# Patient Record
Sex: FEMALE | Race: WHITE | NOT HISPANIC OR LATINO | URBAN - METROPOLITAN AREA
[De-identification: names, ages, dates, MRNs, and addresses within clinical notes are randomized per-mention and may not be internally consistent; named-entity substitution may affect disease eponyms.]

---

## 2023-09-14 PROBLEM — L29.0 PRURITUS ANI: Status: ACTIVE | Noted: 2023-09-14

## 2023-09-14 PROBLEM — K57.30 DIVERTICULOSIS OF COLON: Status: ACTIVE | Noted: 2023-09-14

## 2023-09-14 PROBLEM — K64.4 RESIDUAL HEMORRHOIDAL SKIN TAGS: Status: ACTIVE | Noted: 2023-09-14

## 2023-09-14 PROBLEM — N81.6 RECTOCELE: Status: ACTIVE | Noted: 2023-09-14

## 2023-09-14 PROBLEM — K63.5 HYPERPLASTIC COLON POLYP: Status: ACTIVE | Noted: 2023-09-14

## 2023-09-14 RX ORDER — ALBUTEROL SULFATE 0.83 MG/ML
SOLUTION RESPIRATORY (INHALATION)
COMMUNITY

## 2023-09-14 RX ORDER — MONTELUKAST SODIUM 10 MG/1
TABLET ORAL
COMMUNITY

## 2023-10-17 ENCOUNTER — APPOINTMENT (OUTPATIENT)
Dept: OTOLARYNGOLOGY | Facility: CLINIC | Age: 58
End: 2023-10-17
Payer: COMMERCIAL

## 2023-11-08 ENCOUNTER — EVALUATION (OUTPATIENT)
Dept: PHYSICAL THERAPY | Facility: HOSPITAL | Age: 58
End: 2023-11-08
Payer: COMMERCIAL

## 2023-11-08 DIAGNOSIS — M75.42 IMPINGEMENT SYNDROME OF LEFT SHOULDER: Primary | ICD-10-CM

## 2023-11-08 PROCEDURE — 97110 THERAPEUTIC EXERCISES: CPT | Mod: GP | Performed by: PHYSICAL THERAPIST

## 2023-11-08 ASSESSMENT — ENCOUNTER SYMPTOMS
OCCASIONAL FEELINGS OF UNSTEADINESS: 0
DEPRESSION: 0
LOSS OF SENSATION IN FEET: 0

## 2023-11-08 ASSESSMENT — PAIN - FUNCTIONAL ASSESSMENT: PAIN_FUNCTIONAL_ASSESSMENT: 0-10

## 2023-11-08 ASSESSMENT — PAIN SCALES - GENERAL: PAINLEVEL_OUTOF10: 4

## 2023-11-08 ASSESSMENT — ACTIVITIES OF DAILY LIVING (ADL): ADL_ASSISTANCE: INDEPENDENT

## 2023-11-08 ASSESSMENT — PAIN DESCRIPTION - DESCRIPTORS: DESCRIPTORS: DULL;ACHING

## 2023-11-08 NOTE — PROGRESS NOTES
Physical Therapy    Physical Therapy Evaluation and Treatment      Patient Name: Arlet Vásquez  MRN: [id number]  Today's Date: 11/8/2023  Time Calculation  Start Time: 1747  Stop Time: 1818  Time Calculation (min): 31 min      Assessment:  PT Assessment Results: Decreased strength, Pain, Decreased range of motion  Rehab Prognosis: Good  Evaluation/Treatment Tolerance: Patient tolerated treatment well  Patient demonstrated impaired shoulder strength, cervical spine range of motion, and impaired posture. Skilled physical therapy is warranted to address the above stated impairments, so the patient can perform functional activities and work duties without increased pain or difficulty.    Plan:  Treatment/Interventions: Education/ Instruction, Manual therapy, Therapeutic activities, Therapeutic exercises  PT Plan: Skilled PT  PT Frequency: 2 times per week  Duration: 4 weeks  Onset Date: 10/18/23  Certification Period Start Date: 11/08/23  Certification Period End Date: 01/03/24  Rehab Potential: Good  Plan of Care Agreement: Patient    Current Problem:   1. Impingement syndrome of left shoulder            Subjective    General:  General  Reason for Referral: shoulder pain  Referred By: Jose De Jesus  Patient is a 58 year old female presenting with left shoulder pain. Patient states that she has pain with shoulder internal rotation (using the steering wheel). Patient states that her shoulder started hurting in February and has slowly been improving.    Precautions:  Precautions  STEADI Fall Risk Score (The score of 4 or more indicates an increased risk of falling): 0  Medical Precautions: No known precautions/limitation    Pain:  Pain Assessment  Pain Assessment: 0-10  Pain Score: 4  Pain Location: Shoulder  Pain Orientation: Left  Pain Descriptors: Dull, Aching  Pain Frequency: Constant/continuous  Clinical Progression: Gradually improving  Patient's Stated Pain Goal: No pain  Home Living:   Patient lives with family and is  "independent with all ADLs.  Prior Level of Function:  Prior Function Per Pt/Caregiver Report  Level of [place]: Independent with ADLs and functional transfers, Independent with homemaking with ambulation  ADL Assistance: Independent  Homemaking Assistance: Independent  Ambulatory Assistance: Independent  Vocational: Full time employment    Objective     Extremity/Trunk Assessments:     RUE   RUE : Within Functional Limits  RUE Strength  R Shoulder Flexion: 4+/5  R Shoulder ABduction: 4+/5  R Shoulder Internal Rotation: 4+/5  R Shoulder External Rotation: 4+/5  LUE AROM (degrees)  L Shoulder Flexion  0-170: 167 Degrees  L Shoulder ABduction 0-160/180: 170 Degrees  L Shoulder Internal Rotation  0-70: 68 Degrees  L Shoulder External Rotation  0-90: 90 Degrees  LUE Strength  L Shoulder Flexion: 4+/5  L Shoulder ABduction: 4+/5  L Shoulder Internal Rotation: 4+/5  L Shoulder External Rotation: 4+/5    Cervical Spine    Cervical AROM  Cervical AROM WFL: no    Shoulder Functional Rating Scale  Quick Dash: 11.36    Cervical AROM WFL unless documented below  Cervical AROM WFL: no  Cervical flexion: (80°): 50  Cervical extension: (50°): 50  Cervical Rotation: (80°): 57  Cervical rotation left: (80°): 57  Cervical sidebend right: (45°): 21  Cervical sidebend left: (45°): 34    Shoulder Special Tests Negative unless documented below  Shoulder Special Tests Negative: yes  Neer: (Negative): neg  Painful arc: (Negative): neg  UE Special Tests Comments: - Empty can, - Francesca,    Outcome Measures:  Quick Dash: 11.36     Treatments:  Therapeutic Exercise  Therapeutic Exercise Performed: Yes  Therapeutic Exercise Activity 1: scap retraction x10  Therapeutic Exercise Activity 2: lower trap setting x10  Therapeutic Exercise Activity 3: SCM stretch 5x5\"  Therapeutic Exercise Activity 4: pec minor stretch hands behind head 5x5\"    OP EDUCATION:  Education  Individual(s) Educated: Patient  Education Provided: Anatomy, Home " Exercise Program, POC, Posture  Patient/Caregiver Demonstrated Understanding: yes  Plan of Care Discussed and Agreed Upon: yes  Patient Response to Education: Patient/Caregiver Verbalized Understanding of Information    Goals:  Active       PT Problem       Patient will achieve bilateral cervical side bending ROM 40 degrees for improved ability to perform ADLs.       Start:  11/08/23    Expected End:  12/06/23            Patient will achieve bilateral cervical rotation ROM 70 degrees for improved ability to perform ADLs.       Start:  11/08/23    Expected End:  12/06/23            Patient will achieve cervical flexion ROM 70 degrees for improved ability to perform ADLs.       Start:  11/08/23    Expected End:  12/06/23            Patient will demonstrate independence in home program for support of progression       Start:  11/08/23    Expected End:  11/22/23            Patient will report pain of no more than 2/10 demonstrating a reduction of overall pain       Start:  11/08/23    Expected End:  12/06/23            Patient will show a significant change in Quick Dash patient reported outcome tool to demonstrate subjective imporovement       Start:  11/08/23    Expected End:  12/06/23

## 2023-11-08 NOTE — Clinical Note
November 8, 2023     Patient: Arlet Vásquez   YOB: 1965   Date of Visit: 11/8/2023       To Whom It May Concern:    It is my medical opinion that Arlet Vásquez {Work release (duty restriction):33383}.    If you have any questions or concerns, please don't hesitate to call.         Sincerely,        Mulugeta Mitchell, PT    CC: No Recipients

## 2023-11-08 NOTE — Clinical Note
November 8, 2023     Patient: Arlet Vásquez   YOB: 1965   Date of Visit: 11/8/2023       To Whom it May Concern:    Arlet Vásquez was seen in my clinic on 11/8/2023. She {Return to school/sport:83243}.    If you have any questions or concerns, please don't hesitate to call.         Sincerely,          Mulugeta Mitchell, PT        CC: No Recipients

## 2023-11-13 ENCOUNTER — TREATMENT (OUTPATIENT)
Dept: PHYSICAL THERAPY | Facility: HOSPITAL | Age: 58
End: 2023-11-13
Payer: COMMERCIAL

## 2023-11-13 DIAGNOSIS — M75.42 IMPINGEMENT SYNDROME OF LEFT SHOULDER: ICD-10-CM

## 2023-11-13 PROCEDURE — 97140 MANUAL THERAPY 1/> REGIONS: CPT | Mod: GP,CQ

## 2023-11-13 PROCEDURE — 97110 THERAPEUTIC EXERCISES: CPT | Mod: GP,CQ

## 2023-11-13 ASSESSMENT — PAIN SCALES - GENERAL: PAINLEVEL_OUTOF10: 2

## 2023-11-13 ASSESSMENT — PAIN DESCRIPTION - DESCRIPTORS: DESCRIPTORS: ACHING;DULL

## 2023-11-13 ASSESSMENT — PAIN - FUNCTIONAL ASSESSMENT: PAIN_FUNCTIONAL_ASSESSMENT: 0-10

## 2023-11-13 NOTE — PROGRESS NOTES
"Physical Therapy    Physical Therapy Treatment    Patient Name: Arlet Vásquez  MRN: [id number]  Today's Date: 11/13/2023  Time Calculation  Start Time: 1735  Stop Time: 1820  Time Calculation (min): 45 min      Assessment:Pt. Able to tolerate all exercises without c/o pain and felt better after stretches.  PT Assessment  PT Assessment Results: Decreased strength, Pain, Decreased range of motion  Rehab Prognosis: Good  Evaluation/Treatment Tolerance: Patient tolerated treatment well    Plan: continue with PT poc. Advance as tolerated.  OP PT Plan  Treatment/Interventions: Education/ Instruction, Manual therapy, Therapeutic activities, Therapeutic exercises  PT Plan: Skilled PT  PT Frequency: 2 times per week  Rehab Potential: Good  Plan of Care Agreement: Patient    Current Problem  1. Impingement syndrome of left shoulder  Follow Up In Physical Therapy          Subjective Pt. Reports that she had gotten a shot in her L shoulder this morning and her pain is down.  General  Reason for Referral: shoulder pain  Referred By: Jose De Jesus  Precautions  Precautions  Medical Precautions: No known precautions/limitation       Pain  Pain Assessment: 0-10  Pain Score: 2  Pain Location: Shoulder  Pain Orientation: Left  Pain Descriptors: Aching, Dull  Pain Frequency: Constant/continuous  Clinical Progression: Gradually improving  Patient's Stated Pain Goal: No pain    Objective pt. Working on ROM, stretching and started light strengthening exercises to increase strength and mobility of L shoulder                           Treatments:  Therapeutic Exercise  Therapeutic Exercise Performed: Yes  Therapeutic Exercise Activity 1: scap retraction x10  Therapeutic Exercise Activity 2: lower trap setting x10  Therapeutic Exercise Activity 3: SCM stretch 5x5\"  Therapeutic Exercise Activity 4: pec minor stretch hands behind head 5x5\"  Therapeutic Exercise Activity 5: upper trap stretch 10 sec. x 5  Therapeutic Exercise Activity 6: levator " scap stretch 10 sec. x 5  Therapeutic Exercise Activity 7: iso shoulder flex/ext 3 sec. x 10  Therapeutic Exercise Activity 8: 1so IR/ER 3 sec. x 10  Therapeutic Exercise Activity 9: isoabd/add3 sec. x 10  Therapeutic Exercise Activity 10: ergonometer lv 1 x 2 min.  Therapeutic Exercise Activity 11: scap retract/ ext red x 10    Manual Therapy  Manual Therapy Performed: Yes  Manual Therapy Activity 1: IASTM to L UT  Activity:  Endurance: Tolerates 30+ min exercise without fatigue    OP EDUCATION:  Education  Individual(s) Educated: Patient  Education Provided: Anatomy, Home Exercise Program, POC, Posture  Patient/Caregiver Demonstrated Understanding: yes  Plan of Care Discussed and Agreed Upon: yes  Patient Response to Education: Patient/Caregiver Verbalized Understanding of Information    Goals:  Active       PT Problem       Patient will achieve bilateral cervical side bending ROM 40 degrees for improved ability to perform ADLs.       Start:  11/08/23    Expected End:  12/06/23            Patient will achieve bilateral cervical rotation ROM 70 degrees for improved ability to perform ADLs.       Start:  11/08/23    Expected End:  12/06/23            Patient will achieve cervical flexion ROM 70 degrees for improved ability to perform ADLs.       Start:  11/08/23    Expected End:  12/06/23            Patient will demonstrate independence in home program for support of progression       Start:  11/08/23    Expected End:  11/22/23            Patient will report pain of no more than 2/10 demonstrating a reduction of overall pain       Start:  11/08/23    Expected End:  12/06/23            Patient will show a significant change in Quick Dash patient reported outcome tool to demonstrate subjective imporovement       Start:  11/08/23    Expected End:  12/06/23

## 2023-11-15 ENCOUNTER — TREATMENT (OUTPATIENT)
Dept: PHYSICAL THERAPY | Facility: HOSPITAL | Age: 58
End: 2023-11-15
Payer: COMMERCIAL

## 2023-11-15 DIAGNOSIS — M75.42 IMPINGEMENT SYNDROME OF LEFT SHOULDER: ICD-10-CM

## 2023-11-15 PROCEDURE — 97140 MANUAL THERAPY 1/> REGIONS: CPT | Mod: GP,CQ

## 2023-11-15 PROCEDURE — 97110 THERAPEUTIC EXERCISES: CPT | Mod: GP,CQ

## 2023-11-15 ASSESSMENT — PAIN SCALES - GENERAL: PAINLEVEL_OUTOF10: 4

## 2023-11-15 ASSESSMENT — PAIN - FUNCTIONAL ASSESSMENT: PAIN_FUNCTIONAL_ASSESSMENT: 0-10

## 2023-11-15 NOTE — PROGRESS NOTES
"Physical Therapy    Physical Therapy Treatment    Patient Name: Arlet Vásquez  MRN: [id number]  Today's Date: 11/15/2023  Time Calculation  Start Time: 1732  Stop Time: 1812  Time Calculation (min): 40 min      Assessment:Pt was able to tolerate joint mobilizations without c/o pain.  Pt had pain with palpation to the L infraspinatus mm belly, able to tolerate manual technique to decrease mm tension.   Pt required cuing for technique and pace with TE's performed today, with good follow through after instruction.  PT Assessment  PT Assessment Results: Decreased strength, Pain, Decreased range of motion  Rehab Prognosis: Good    Plan:Continue to progress current POC as tolerated to facilitate ability to perform functional activities.   OP PT Plan  PT Plan: Skilled PT  PT Frequency: 2 times per week (2 of 8)    Current Problem  1. Impingement syndrome of left shoulder  Follow Up In Physical Therapy          Subjective Pt reports mild soreness after last session and during t-band extension exercises issued last session.    General  Reason for Referral: shoulder pain  Referred By: Sarahn  Precautions  Precautions  Medical Precautions: No known precautions/limitation  Vital Signs     Pain  Pain Assessment: 0-10  Pain Score: 4  Pain Location: Shoulder  Pain Orientation: Left    Objective   Cognition     Posture     Extremity/Trunk Assessment          Outcome Measures:      Treatments:  Therapeutic Exercise  Therapeutic Exercise Performed: Yes  Therapeutic Exercise Activity 2: lower trap setting x10  Therapeutic Exercise Activity 3: SCM stretch 5x5\"  Therapeutic Exercise Activity 5: upper trap stretch 10 sec. x 5  Therapeutic Exercise Activity 6: levator scap stretch 10 sec. x 5  Therapeutic Exercise Activity 7: iso shoulder flex/ext 3 sec. x 10  Therapeutic Exercise Activity 8: Iso ER with yellow band x 10  Therapeutic Exercise Activity 9: Iso Abduction and Flexion x 10 5\"  Therapeutic Exercise Activity 10: UBE Level 1 2' " each  Therapeutic Exercise Activity 11: Scapular retraction 2 x 10  Therapeutic Exercise Activity 12: B shoulder Extension Red band 2 x 10    Manual Therapy  Manual Therapy Performed: Yes  Manual Therapy Activity 1: L infraspinatus trigger point release to improve soft tissue mobility  Manual Therapy Activity 2: L shoulder posterior and inferior glides grade II-III  Manual Therapy Activity 3: L AC postero/inferior glide and SC inferior glides to increase mobility  Activity:      OP EDUCATION:  Education  Individual(s) Educated: Patient  Education Provided: Anatomy, Home Exercise Program  Patient Response to Education: Patient/Caregiver Verbalized Understanding of Information    Goals:  Active       PT Problem       Patient will achieve bilateral cervical side bending ROM 40 degrees for improved ability to perform ADLs. (Progressing)       Start:  11/08/23    Expected End:  12/06/23            Patient will achieve bilateral cervical rotation ROM 70 degrees for improved ability to perform ADLs. (Progressing)       Start:  11/08/23    Expected End:  12/06/23            Patient will achieve cervical flexion ROM 70 degrees for improved ability to perform ADLs. (Progressing)       Start:  11/08/23    Expected End:  12/06/23            Patient will demonstrate independence in home program for support of progression (Progressing)       Start:  11/08/23    Expected End:  11/22/23            Patient will report pain of no more than 2/10 demonstrating a reduction of overall pain (Progressing)       Start:  11/08/23    Expected End:  12/06/23            Patient will show a significant change in Quick Dash patient reported outcome tool to demonstrate subjective imporovement (Progressing)       Start:  11/08/23    Expected End:  12/06/23

## 2023-11-20 ENCOUNTER — TREATMENT (OUTPATIENT)
Dept: PHYSICAL THERAPY | Facility: HOSPITAL | Age: 58
End: 2023-11-20
Payer: COMMERCIAL

## 2023-11-20 DIAGNOSIS — M75.42 IMPINGEMENT SYNDROME OF LEFT SHOULDER: Primary | ICD-10-CM

## 2023-11-20 PROCEDURE — 97110 THERAPEUTIC EXERCISES: CPT | Mod: GP,CQ

## 2023-11-20 PROCEDURE — 97140 MANUAL THERAPY 1/> REGIONS: CPT | Mod: GP,CQ

## 2023-11-20 ASSESSMENT — PAIN SCALES - GENERAL: PAINLEVEL_OUTOF10: 3

## 2023-11-20 ASSESSMENT — PAIN - FUNCTIONAL ASSESSMENT: PAIN_FUNCTIONAL_ASSESSMENT: 0-10

## 2023-11-20 ASSESSMENT — PAIN DESCRIPTION - DESCRIPTORS: DESCRIPTORS: ACHING;DULL

## 2023-11-20 NOTE — PROGRESS NOTES
"Physical Therapy    Physical Therapy Treatment    Patient Name: Arlet Vásquez  MRN: [id number]  Today's Date: 11/20/2023  Time Calculation  Start Time: 1735  Stop Time: 1825  Time Calculation (min): 50 min      Assessment:Pt. Able to tolerate all exercises, and reported decreased pain to 2/10 at conclusion of therapy. AROM is good, but pt. Experiences a clicking sensation with IR.  PT Assessment  PT Assessment Results: Decreased strength, Pain, Decreased range of motion  Rehab Prognosis: Good  Evaluation/Treatment Tolerance: Patient tolerated treatment well  Plan: cont. With current PT POC, advance as tolerated.  OP PT Plan  PT Plan: Skilled PT  PT Frequency: 2 times per week (3  of 8)  Rehab Potential: Good  Plan of Care Agreement: Patient    Current Problem  1. Impingement syndrome of left shoulder            General     General  Reason for Referral: shoulder pain  Referred By: Jose De Jesus Marley   Pt. Reports she is feeling alright, but her pain has not decreased in 3 visits now.  Precautions  Precautions  Medical Precautions: No known precautions/limitation       Pain  Pain Assessment  Pain Assessment: 0-10  Pain Score: 3  Pain Location: Shoulder  Pain Orientation: Left  Pain Descriptors: Aching, Dull  Clinical Progression: Gradually improving  Patient's Stated Pain Goal: No pain    Objective Pt. Working on stretching and strengthening of L shoulder and scap muscles for return to normal functional activities without pain.                  Activity Tolerance:  Activity Tolerance  Endurance: Tolerates 30+ min exercise without fatigue      Treatments:  Therapeutic Exercise  Therapeutic Exercise Performed: Yes  Therapeutic Exercise Activity 1: iso L shoulder ADD 3 sec. x 10  Therapeutic Exercise Activity 2: lower trap setting x10  Therapeutic Exercise Activity 3: SCM stretch 5x5\"  Therapeutic Exercise Activity 4: pec stretch 15 sec. x 3  Therapeutic Exercise Activity 5: upper trap stretch 10 sec. x 5  Therapeutic " "Exercise Activity 6: levator scap stretch 10 sec. x 5  Therapeutic Exercise Activity 7: iso shoulder flex/ext 3 sec. x 10  Therapeutic Exercise Activity 8: ER IR with yellow band x 10  Therapeutic Exercise Activity 9: Iso Abduction and Flexion x 10 5\"  Therapeutic Exercise Activity 10: UBE Level 1 2' each  Therapeutic Exercise Activity 11: Scapular retraction 2 x 10  Therapeutic Exercise Activity 12: B shoulder Extension Red band 2 x 10  Therapeutic Exercise Activity 13: prone H ABD x 10  Therapeutic Exercise Activity 14: prone scap ret.  Therapeutic Exercise Activity 15: prone bshoulder flex/ext x 10    OP EDUCATION:  Outpatient Education  Individual(s) Educated: Patient  Education Provided: Anatomy, Home Exercise Program  Patient Response to Education: Patient/Caregiver Verbalized Understanding of Information    Goals:  Active       PT Problem       Patient will achieve bilateral cervical side bending ROM 40 degrees for improved ability to perform ADLs. (Progressing)       Start:  11/08/23    Expected End:  12/06/23            Patient will achieve bilateral cervical rotation ROM 70 degrees for improved ability to perform ADLs. (Progressing)       Start:  11/08/23    Expected End:  12/06/23            Patient will achieve cervical flexion ROM 70 degrees for improved ability to perform ADLs. (Progressing)       Start:  11/08/23    Expected End:  12/06/23            Patient will demonstrate independence in home program for support of progression (Progressing)       Start:  11/08/23    Expected End:  11/22/23            Patient will report pain of no more than 2/10 demonstrating a reduction of overall pain (Progressing)       Start:  11/08/23    Expected End:  12/06/23            Patient will show a significant change in Quick Dash patient reported outcome tool to demonstrate subjective imporovement (Progressing)       Start:  11/08/23    Expected End:  12/06/23              "

## 2023-11-22 ENCOUNTER — TREATMENT (OUTPATIENT)
Dept: PHYSICAL THERAPY | Facility: HOSPITAL | Age: 58
End: 2023-11-22
Payer: COMMERCIAL

## 2023-11-22 DIAGNOSIS — M75.42 IMPINGEMENT SYNDROME OF LEFT SHOULDER: ICD-10-CM

## 2023-11-22 PROCEDURE — 97110 THERAPEUTIC EXERCISES: CPT | Mod: GP,CQ

## 2023-11-22 PROCEDURE — 97140 MANUAL THERAPY 1/> REGIONS: CPT | Mod: GP,CQ

## 2023-11-22 ASSESSMENT — PAIN SCALES - GENERAL: PAINLEVEL_OUTOF10: 4

## 2023-11-22 ASSESSMENT — PAIN - FUNCTIONAL ASSESSMENT: PAIN_FUNCTIONAL_ASSESSMENT: 0-10

## 2023-11-22 NOTE — PROGRESS NOTES
"Physical Therapy    Physical Therapy Treatment    Patient Name: Arlet Vásquez  MRN: [id number]  Today's Date: 11/22/2023  Time Calculation  Start Time: 1732  Stop Time: 1812  Time Calculation (min): 40 min      Assessment:Pt stated the ache in her shoulder was relieved with manual techniques completed today.  Pt was able to perform all strengthening TE's without c/o pain in the L shoulder.   PT Assessment  PT Assessment Results: Decreased strength, Pain, Decreased range of motion  Rehab Prognosis: Good  Plan:Continue to progress current POC as tolerated to facilitate ability to perform functional activities.   OP PT Plan  PT Plan: Skilled PT  PT Frequency: 2 times per week    Current Problem  1. Impingement syndrome of left shoulder  Follow Up In Physical Therapy          General  PT  Visit  PT Received On: 11/22/23  Response to Previous Treatment: Patient with no complaints from previous session.  General  Reason for Referral: shoulder pain  Referred By: Jose De Jesus    Subjective  Reports she has a dull pain most of the time, then will have increase pain reaching across her body.    Precautions  Precautions  Medical Precautions: No known precautions/limitation  Vital Signs     Pain  Pain Assessment  Pain Assessment: 0-10  Pain Score: 4  Pain Location: Shoulder  Pain Orientation: Left    Objective   Cognition     Posture     Extremity/Trunk Assessment      Activity Tolerance:     Outcome Measures:    Treatments:  Therapeutic Exercise  Therapeutic Exercise Performed: Yes  Therapeutic Exercise Activity 3: Ball at wall up/down x 20  Therapeutic Exercise Activity 4: pec stretch 15 sec. x 3  Therapeutic Exercise Activity 7: Shoulder flexion 1# x20  Therapeutic Exercise Activity 8: Iso ER with side step x 10 5\"  Therapeutic Exercise Activity 10: UBE Level 1 2' each  Therapeutic Exercise Activity 11: Scapular retraction Red 2 x 10  Therapeutic Exercise Activity 12: B shoulder Extension Red band 2 x 10    Manual Therapy  Manual " Therapy Performed: Yes  Manual Therapy Activity 1: L Shoulder flexion with scap block to increase flexibility  Manual Therapy Activity 2: L shoulder posterior and inferior glides grade II-III  Manual Therapy Activity 3: L AC postero/inferior glide and SC inferior glides to increase mobility    OP EDUCATION:  Outpatient Education  Individual(s) Educated: Patient  Education Provided: Anatomy, Home Exercise Program  Patient Response to Education: Patient/Caregiver Verbalized Understanding of Information    Goals:  Active       PT Problem       Patient will achieve bilateral cervical side bending ROM 40 degrees for improved ability to perform ADLs. (Progressing)       Start:  11/08/23    Expected End:  12/06/23            Patient will achieve bilateral cervical rotation ROM 70 degrees for improved ability to perform ADLs. (Progressing)       Start:  11/08/23    Expected End:  12/06/23            Patient will achieve cervical flexion ROM 70 degrees for improved ability to perform ADLs. (Progressing)       Start:  11/08/23    Expected End:  12/06/23            Patient will demonstrate independence in home program for support of progression (Progressing)       Start:  11/08/23    Expected End:  11/22/23            Patient will report pain of no more than 2/10 demonstrating a reduction of overall pain (Progressing)       Start:  11/08/23    Expected End:  12/06/23            Patient will show a significant change in Quick Dash patient reported outcome tool to demonstrate subjective imporovement (Progressing)       Start:  11/08/23    Expected End:  12/06/23

## 2023-11-27 ENCOUNTER — TREATMENT (OUTPATIENT)
Dept: PHYSICAL THERAPY | Facility: HOSPITAL | Age: 58
End: 2023-11-27
Payer: COMMERCIAL

## 2023-11-27 DIAGNOSIS — M75.42 IMPINGEMENT SYNDROME OF LEFT SHOULDER: Primary | ICD-10-CM

## 2023-11-27 PROCEDURE — 97110 THERAPEUTIC EXERCISES: CPT | Mod: GP,CQ

## 2023-11-27 PROCEDURE — 97140 MANUAL THERAPY 1/> REGIONS: CPT | Mod: GP,CQ

## 2023-11-27 ASSESSMENT — PAIN - FUNCTIONAL ASSESSMENT: PAIN_FUNCTIONAL_ASSESSMENT: 0-10

## 2023-11-27 ASSESSMENT — PAIN SCALES - GENERAL: PAINLEVEL_OUTOF10: 2

## 2023-11-27 ASSESSMENT — PAIN DESCRIPTION - DESCRIPTORS: DESCRIPTORS: ACHING;DULL

## 2023-11-27 NOTE — PROGRESS NOTES
Physical Therapy    Physical Therapy Treatment    Patient Name: Arlet Vásquez  MRN: [id number]  Today's Date: 11/27/2023     Time Calculation  Start Time: 1740  Stop Time: 1825  Time Calculation (min): 45 min    Assessment/Plan Pt. Able to tolerate all exercises and manual therapy and reported no increased pain. Pt able to achieve full flexion supine, but reported a clicking sensation towards end range. Will continue with current PT POC, and advance as tolerated.  PT Assessment  PT Assessment Results: Decreased strength, Decreased range of motion, Pain  Rehab Prognosis: Good  Evaluation/Treatment Tolerance: Patient tolerated treatment well  PT Plan  Inpatient/Swing Bed or Outpatient: Outpatient  OP PT Plan  PT Plan: Skilled PT  PT Frequency: 2 times per week (5 of 8)  Rehab Potential: Good  Plan of Care Agreement: Patient      General Visit Information:   PT  Visit  PT Received On: 11/27/23  Response to Previous Treatment: Patient with no complaints from previous session.  General  Reason for Referral: shoulder pain  Referred By: Jose De Jesus Marley Pt. Reported that she has less pain today at 2/10 and she believes she is progressivly getting better. Pt. States she is compliant with HEP and has no questions at this time.  Precautions:  Precautions  Medical Precautions: No known precautions/limitation         Objective Pt. Working on L shoulder ROM and strength to decrease pain and improve mobility.  Pain:  Pain Assessment  Pain Assessment: 0-10  Pain Score: 2  Pain Location: Shoulder  Pain Orientation: Left  Pain Descriptors: Aching, Dull  Clinical Progression: Gradually improving  Patient's Stated Pain Goal: No pain                Activity Tolerance:  Activity Tolerance  Endurance: Tolerates 30+ min exercise without fatigue  Treatments:  Therapeutic Exercise  Therapeutic Exercise Performed: Yes  Therapeutic Exercise Activity 3: Ball at wall up/down x 20  Therapeutic Exercise Activity 4: pec stretch 15 sec. x  "3  Therapeutic Exercise Activity 7: Shoulder flexion 1# x20  Therapeutic Exercise Activity 8: Iso ER with side step x 10 5\"  Therapeutic Exercise Activity 10: UBE lv. 2 2 min ea.  Therapeutic Exercise Activity 11: Scapular retraction Red 2 x 10  Therapeutic Exercise Activity 12: B shoulder Extension Red band 2 x 10  Therapeutic Exercise Activity 13: prone H ABD x 10    Manual Therapy  Manual Therapy Performed: Yes  Manual Therapy Activity 1: L shoulder flex with scap block  Manual Therapy Activity 2: L shoulder post/inf glides grade 2/3      Education Documentation  No documentation found.  Education Comments  No comments found.        OP EDUCATION:  Outpatient Education  Individual(s) Educated: Patient  Education Provided: Body Mechanics, Home Exercise Program  Patient Response to Education: Patient/Caregiver Verbalized Understanding of Information    Active       PT Problem       Patient will achieve bilateral cervical side bending ROM 40 degrees for improved ability to perform ADLs. (Progressing)       Start:  11/08/23    Expected End:  12/06/23            Patient will achieve bilateral cervical rotation ROM 70 degrees for improved ability to perform ADLs. (Progressing)       Start:  11/08/23    Expected End:  12/06/23            Patient will achieve cervical flexion ROM 70 degrees for improved ability to perform ADLs. (Progressing)       Start:  11/08/23    Expected End:  12/06/23            Patient will demonstrate independence in home program for support of progression (Progressing)       Start:  11/08/23    Expected End:  11/22/23         Goal Note       Patient will demonstrate independence in home program for support of progression              Patient will report pain of no more than 2/10 demonstrating a reduction of overall pain (Progressing)       Start:  11/08/23    Expected End:  12/06/23            Patient will show a significant change in Quick Dash patient reported outcome tool to demonstrate " subjective imporovement (Progressing)       Start:  11/08/23    Expected End:  12/06/23

## 2023-11-29 ENCOUNTER — TREATMENT (OUTPATIENT)
Dept: PHYSICAL THERAPY | Facility: HOSPITAL | Age: 58
End: 2023-11-29
Payer: COMMERCIAL

## 2023-11-29 DIAGNOSIS — M75.42 IMPINGEMENT SYNDROME OF LEFT SHOULDER: Primary | ICD-10-CM

## 2023-11-29 PROCEDURE — 97140 MANUAL THERAPY 1/> REGIONS: CPT | Mod: GP | Performed by: PHYSICAL THERAPIST

## 2023-11-29 PROCEDURE — 97110 THERAPEUTIC EXERCISES: CPT | Mod: GP | Performed by: PHYSICAL THERAPIST

## 2023-11-29 ASSESSMENT — PAIN DESCRIPTION - DESCRIPTORS: DESCRIPTORS: ACHING

## 2023-11-29 ASSESSMENT — PAIN - FUNCTIONAL ASSESSMENT: PAIN_FUNCTIONAL_ASSESSMENT: 0-10

## 2023-11-29 ASSESSMENT — PAIN SCALES - GENERAL: PAINLEVEL_OUTOF10: 2

## 2023-11-29 NOTE — PROGRESS NOTES
Physical Therapy    Physical Therapy Treatment    Patient Name: Arlet Vásquez  MRN: [id number]  Today's Date: 11/29/2023  Time Calculation  Start Time: 1726  Stop Time: 1806  Time Calculation (min): 40 min      Assessment:  PT Assessment  PT Assessment Results: Decreased strength, Decreased range of motion, Pain  Rehab Prognosis: Good  Evaluation/Treatment Tolerance: Patient tolerated treatment well  Patient is progressing with physical therapy. She appears to be having an easier time at work with her shoulder pain decreasing. Patient demonstrated good tolerance to PROGRESSIVE RESISTED EXERCISE and manual therapy without complaints of pain.     Plan:  OP PT Plan  Treatment/Interventions: Education/ Instruction, Manual therapy, Therapeutic activities, Therapeutic exercises  PT Plan: Skilled PT  PT Frequency: 2 times per week (7 of 8)  Duration: 4 weeks  Onset Date: 10/18/23  Certification Period Start Date: 11/08/23  Certification Period End Date: 01/03/24  Rehab Potential: Good  Plan of Care Agreement: Patient    Current Problem  1. Impingement syndrome of left shoulder  Follow Up In Physical Therapy          General  PT  Visit  PT Received On: 11/29/23  Response to Previous Treatment: Patient with no complaints from previous session.  General  Reason for Referral: shoulder pain  Referred By: Jose De Jesus    Subjective    Precautions  Precautions  Medical Precautions: No known precautions/limitation  Patient states that her shoulder is hurting less and feels like it's getting better.    Pain  Pain Assessment  Pain Assessment: 0-10  Pain Score: 2  Pain Location: Shoulder  Pain Orientation: Left  Pain Descriptors: Aching  Clinical Progression: Gradually improving  Patient's Stated Pain Goal: No pain    Objective     Treatments:  Therapeutic Exercise  Therapeutic Exercise Performed: Yes  Therapeutic Exercise Activity 2: lower trap setting @ wall x10  Therapeutic Exercise Activity 3: Ball at wall up/down x 20  Therapeutic  "Exercise Activity 4: pec stretch 15 sec. x 3  Therapeutic Exercise Activity 5: upper trap stretch 1x45\"  Therapeutic Exercise Activity 6: levator scap stretch 1x45\"  Therapeutic Exercise Activity 7: Shoulder flexion 2# x15  Therapeutic Exercise Activity 8: Iso ER with red band and side step x 10 5\"  Therapeutic Exercise Activity 10: UBE lv. 2 2 min ea.  Therapeutic Exercise Activity 11: Scapular retraction grn band x15  Therapeutic Exercise Activity 12: B shoulder Extension grn band x15  Therapeutic Exercise Activity 13: prone H ABD x 15  Therapeutic Exercise Activity 14: supine scap protraction x10  Therapeutic Exercise Activity 16: wall walks with yellow band lateral and diagonal up/out x5 each R/L  Therapeutic Exercise Activity 17: s/l shoulder abd x20  Therapeutic Exercise Activity 18: scm stretch 1x45\"    Manual Therapy  Manual Therapy Performed: Yes  Manual Therapy Activity 1: L shoulder flex with scap block  Manual Therapy Activity 2: L shoulder post/inf glides grade 3    OP EDUCATION:  Outpatient Education  Individual(s) Educated: Patient  Education Provided: Body Mechanics, Home Exercise Program, POC  Patient Response to Education: Patient/Caregiver Verbalized Understanding of Information    Goals:  Active       PT Problem       Patient will achieve bilateral cervical side bending ROM 40 degrees for improved ability to perform ADLs. (Progressing)       Start:  11/08/23    Expected End:  12/06/23            Patient will achieve bilateral cervical rotation ROM 70 degrees for improved ability to perform ADLs. (Progressing)       Start:  11/08/23    Expected End:  12/06/23            Patient will achieve cervical flexion ROM 70 degrees for improved ability to perform ADLs. (Progressing)       Start:  11/08/23    Expected End:  12/06/23            Patient will demonstrate independence in home program for support of progression (Progressing)       Start:  11/08/23    Expected End:  11/22/23         Goal Note  "      Patient will demonstrate independence in home program for support of progression              Patient will report pain of no more than 2/10 demonstrating a reduction of overall pain (Progressing)       Start:  11/08/23    Expected End:  12/06/23            Patient will show a significant change in Quick Dash patient reported outcome tool to demonstrate subjective imporovement (Progressing)       Start:  11/08/23    Expected End:  12/06/23

## 2023-12-06 ENCOUNTER — TREATMENT (OUTPATIENT)
Dept: PHYSICAL THERAPY | Facility: HOSPITAL | Age: 58
End: 2023-12-06
Payer: COMMERCIAL

## 2023-12-06 DIAGNOSIS — M75.42 IMPINGEMENT SYNDROME OF LEFT SHOULDER: Primary | ICD-10-CM

## 2023-12-06 PROCEDURE — 97110 THERAPEUTIC EXERCISES: CPT | Mod: GP | Performed by: PHYSICAL THERAPIST

## 2023-12-06 ASSESSMENT — PAIN SCALES - GENERAL: PAINLEVEL_OUTOF10: 1

## 2023-12-06 ASSESSMENT — PAIN - FUNCTIONAL ASSESSMENT: PAIN_FUNCTIONAL_ASSESSMENT: 0-10

## 2023-12-06 ASSESSMENT — PAIN DESCRIPTION - DESCRIPTORS: DESCRIPTORS: ACHING

## 2023-12-06 NOTE — PROGRESS NOTES
Physical Therapy    Physical Therapy Treatment and Discharge      Patient Name: Arlet Vásquez  MRN: [id number]  Today's Date: 12/6/2023  Time Calculation  Start Time: 1731  Stop Time: 1809  Time Calculation (min): 38 min    Assessment:  PT Assessment  Evaluation/Treatment Tolerance: Patient tolerated treatment well   Patient demonstrated good progress. Patient's pain is less than a 1 and she is no longer having shoulder pain while driving. Patient is appropriate to discharge to her home exercise program. Patient agrees with the plan.    Plan:  OP PT Plan  Treatment/Interventions: Education/ Instruction, Manual therapy, Therapeutic activities, Therapeutic exercises  PT Plan: No Additional PT interventions required at this time    Current Problem:   1. Impingement syndrome of left shoulder  Follow Up In Physical Therapy          Subjective    General:  General  Reason for Referral: shoulder pain  Referred By: Jose De Jesus  Patient states that her shoulder is feeling good. She states that she is not having pain when turning the steering wheel like she was having prior to physical therapy.    Precautions:  Precautions  Medical Precautions: No known precautions/limitation  Vital Signs:     Pain:  Pain Assessment  Pain Assessment: 0-10  Pain Score: 1  Pain Location: Shoulder  Pain Orientation: Left  Pain Descriptors: Aching  Clinical Progression: Gradually improving  Patient's Stated Pain Goal: No pain    Objective   Extremity/Trunk Assessments:        LUE Strength  L Shoulder Flexion: 5/5  L Shoulder ABduction: 5/5  L Shoulder Internal Rotation: 5/5  L Shoulder External Rotation: 5/5  Cervical Spine  Cervical AROM WFL unless documented below  Cervical flexion: (80°): 46  Cervical extension: (50°): 50  Cervical Rotation: (80°): 68  Cervical rotation left: (80°): 60  Cervical sidebend right: (45°): 28  Cervical sidebend left: (45°): 37    Outcome Measures:  Quick Dash=2.27     Treatments:  Therapeutic Exercise  Therapeutic  "Exercise Performed: Yes  Therapeutic Exercise Activity 4: pec stretch 15 sec. x 3  Therapeutic Exercise Activity 5: upper trap stretch 1x45\"  Therapeutic Exercise Activity 6: levator scap stretch 1x45\"  Therapeutic Exercise Activity 7: Shoulder flexion 2# x15  Therapeutic Exercise Activity 8: Iso ER with red band and side step x 10 5\"  Therapeutic Exercise Activity 10: UBE lv. 2 2 min fwd/bwd seat #6  Therapeutic Exercise Activity 11: Scapular retraction grn band x20  Therapeutic Exercise Activity 12: B shoulder Extension grn band x20  Therapeutic Exercise Activity 14: supine scap protraction 1# x10  Therapeutic Exercise Activity 15: PNF D2 shoulder flex yellow band x10  Therapeutic Exercise Activity 16: wall walks with yellow band lateral and diagonal up/out x5 each R/L  Therapeutic Exercise Activity 17: s/l shoulder abd 1#x15  Therapeutic Exercise Activity 18: scm stretch 1x45\"    EDUCATION:  Outpatient Education  Individual(s) Educated: Patient  Education Provided: Body Mechanics, Home Exercise Program, POC  Patient Response to Education: Patient/Caregiver Verbalized Understanding of Information    Goals:  Active       PT Problem       Patient will achieve bilateral cervical side bending ROM 40 degrees for improved ability to perform ADLs. (Progressing)       Start:  11/08/23    Expected End:  12/06/23            Patient will achieve bilateral cervical rotation ROM 70 degrees for improved ability to perform ADLs. (Progressing)       Start:  11/08/23    Expected End:  12/06/23            Patient will achieve cervical flexion ROM 70 degrees for improved ability to perform ADLs. (Progressing)       Start:  11/08/23    Expected End:  12/06/23            Patient will demonstrate independence in home program for support of progression (Met)       Start:  11/08/23    Expected End:  11/22/23    Resolved:  12/06/23         Patient will report pain of no more than 2/10 demonstrating a reduction of overall pain (Met)       " Start:  11/08/23    Expected End:  12/06/23    Resolved:  12/06/23         Patient will show a significant change in Quick Dash patient reported outcome tool to demonstrate subjective imporovement (Met)       Start:  11/08/23    Expected End:  12/06/23    Resolved:  12/06/23

## 2023-12-28 ENCOUNTER — OFFICE VISIT (OUTPATIENT)
Dept: SLEEP MEDICINE | Facility: CLINIC | Age: 58
End: 2023-12-28
Payer: COMMERCIAL

## 2023-12-28 VITALS
HEART RATE: 72 BPM | OXYGEN SATURATION: 94 % | DIASTOLIC BLOOD PRESSURE: 75 MMHG | HEIGHT: 63 IN | WEIGHT: 168.2 LBS | SYSTOLIC BLOOD PRESSURE: 108 MMHG | BODY MASS INDEX: 29.8 KG/M2

## 2023-12-28 DIAGNOSIS — G47.30 SLEEP-DISORDERED BREATHING: Primary | ICD-10-CM

## 2023-12-28 DIAGNOSIS — J45.909 ASTHMA, UNSPECIFIED ASTHMA SEVERITY, UNSPECIFIED WHETHER COMPLICATED, UNSPECIFIED WHETHER PERSISTENT (HHS-HCC): ICD-10-CM

## 2023-12-28 DIAGNOSIS — E66.3 OVERWEIGHT (BMI 25.0-29.9): ICD-10-CM

## 2023-12-28 DIAGNOSIS — J30.2 SEASONAL ALLERGIES: ICD-10-CM

## 2023-12-28 DIAGNOSIS — R06.83 SNORING: ICD-10-CM

## 2023-12-28 PROBLEM — K57.30 DIVERTICULOSIS OF COLON: Status: RESOLVED | Noted: 2023-09-14 | Resolved: 2023-12-28

## 2023-12-28 PROCEDURE — 3008F BODY MASS INDEX DOCD: CPT

## 2023-12-28 PROCEDURE — 99203 OFFICE O/P NEW LOW 30 MIN: CPT

## 2023-12-28 PROCEDURE — 1036F TOBACCO NON-USER: CPT

## 2023-12-28 RX ORDER — LIFITEGRAST 50 MG/ML
1 SOLUTION/ DROPS OPHTHALMIC 2 TIMES DAILY
COMMUNITY

## 2023-12-28 RX ORDER — FLUTICASONE PROPIONATE 50 MCG
1 SPRAY, SUSPENSION (ML) NASAL AS NEEDED
COMMUNITY

## 2023-12-28 ASSESSMENT — SLEEP AND FATIGUE QUESTIONNAIRES
SITING INACTIVE IN A PUBLIC PLACE LIKE A CLASS ROOM OR A MOVIE THEATER: SLIGHT CHANCE OF DOZING
HOW LIKELY ARE YOU TO NOD OFF OR FALL ASLEEP WHILE LYING DOWN TO REST IN THE AFTERNOON WHEN CIRCUMSTANCES PERMIT: MODERATE CHANCE OF DOZING
HOW LIKELY ARE YOU TO NOD OFF OR FALL ASLEEP WHILE SITTING AND TALKING TO SOMEONE: WOULD NEVER DOZE
WORRIED_DISTRESSED_DUE_TO_SLEEP: SOMEWHAT
SLEEP_PROBLEM_NOTICEABLE_TO_OTHERS: MUCH
HOW LIKELY ARE YOU TO NOD OFF OR FALL ASLEEP WHEN YOU ARE A PASSENGER IN A CAR FOR AN HOUR WITHOUT A BREAK: SLIGHT CHANCE OF DOZING
HOW LIKELY ARE YOU TO NOD OFF OR FALL ASLEEP WHILE WATCHING TV: SLIGHT CHANCE OF DOZING
SLEEP_PROBLEM_INTERFERES_DAILY_ACTIVITIES: NOT AT ALL NOTICEABLE
HOW LIKELY ARE YOU TO NOD OFF OR FALL ASLEEP IN A CAR, WHILE STOPPED FOR A FEW MINUTES IN TRAFFIC: WOULD NEVER DOZE
ESS-CHAD TOTAL SCORE: 9
DIFFICULTY_STAYING_ASLEEP: MILD
HOW LIKELY ARE YOU TO NOD OFF OR FALL ASLEEP WHILE SITTING QUIETLY AFTER LUNCH WITHOUT ALCOHOL: SLIGHT CHANCE OF DOZING
SATISFACTION_WITH_CURRENT_SLEEP_PATTERN: SATISFIED
WAKING_TOO_EARLY: MILD
HOW LIKELY ARE YOU TO NOD OFF OR FALL ASLEEP WHILE SITTING AND READING: HIGH CHANCE OF DOZING

## 2023-12-28 ASSESSMENT — ANXIETY QUESTIONNAIRES
6. BECOMING EASILY ANNOYED OR IRRITABLE: NOT AT ALL
3. WORRYING TOO MUCH ABOUT DIFFERENT THINGS: NOT AT ALL
1. FEELING NERVOUS, ANXIOUS, OR ON EDGE: NOT AT ALL
2. NOT BEING ABLE TO STOP OR CONTROL WORRYING: NOT AT ALL
7. FEELING AFRAID AS IF SOMETHING AWFUL MIGHT HAPPEN: NOT AT ALL
GAD7 TOTAL SCORE: 0
4. TROUBLE RELAXING: NOT AT ALL
5. BEING SO RESTLESS THAT IT IS HARD TO SIT STILL: NOT AT ALL

## 2023-12-28 ASSESSMENT — PATIENT HEALTH QUESTIONNAIRE - PHQ9
2. FEELING DOWN, DEPRESSED OR HOPELESS: NOT AT ALL
SUM OF ALL RESPONSES TO PHQ9 QUESTIONS 1 AND 2: 0
1. LITTLE INTEREST OR PLEASURE IN DOING THINGS: NOT AT ALL

## 2023-12-28 NOTE — LETTER
Your Provider has ordered you a sleep study.  The process for a home sleep study is as follows:    HOME SLEEP STUDY    Your test was ordered today. It will usually take 2 - 3 business days for Insurance to approve the order.     Once you test is approved it will be sent to the ordering sleep lab. When the sleep lab is notified of the new order, they will reach out to you to get you scheduled for a pick-up date for your Home Sleep Study Kit or notify you of when it will be mailed (CLEVMED).     They usually will reach out to you about 1 week after your test is ordered. Please contact the office at [phone] if you have not heard back from them in 2 weeks after you have seen your provider. See below for list of sleep lab contact numbers, if needed.     Sleep Lab Contact Information:   Main Phone Line (scheduling only): [phone] ([phone]), option 3  Adult and Pediatric Locations  [facility] (6 years and older): [facility] - 4th floor ([facility] [facility]) After hours line: [phone]  [facility] (Main campus: All ages): [facility], 6th floor. After hours line: [phone]   [place] (5 years and older; younger considered on case-by-case basis): [address]; [address], [address]. Scheduling  After hours line: [phone]   [place] (6 years and older): [address]; [address]; [address]   [place] (6 years and older): [address] [facility], [address]  After hours line: [phone]   [place] (13 years and older) in [place]: [address], 2nd floor  After hours line: [phone]   [place] (13 year and older): [address]  After hours line: [phone]     Adult Only Locations:   [place] (18 years and older): [address] [facility], 2nd [address]   [place] (18 years and older): [address] [facility]; 4th floor  After hours line: [phone]  [facility] (18 years and older) at  [place]: [address]  After hours line: [phone]

## 2023-12-28 NOTE — PATIENT INSTRUCTIONS
It was a pleasure meeting you today Arlet Vásquez     As we discussed today in clinic:    1. Do in-home sleep testing.    2. Encouraged to lose weight.   3. Remember, don't drive when sleepy.   4. Stay off your back when sleeping.      As a general guideline, please replace your: PAP cushions every 2-4 weeks, mask every 3-6 months, hose every 3-6 months, Filter (disposable) every 2-4 weeks; machine may need replacement in 5-10 years (once they stop working).     Education handouts given: Sleep Study Information    Please follow-up in 4 - 6 weeks after testing    ALWAYS BRING YOUR CPAP / BIPAP WITH YOU TO EVERY APPOINTMENT!  THANKS    EDUCATION WEBSITES for further information:   1. American Academy of Sleep Medicine http://sleepeducation.org  2. National Sleep Foundation: https://sleepfoundation.org  3. American Sleep Apnea Association: https://www.sleepapnea.org (for patients with sleep apnea)  4. Go to www.inspiresleep.com learn about [employer].    FOR QUESTIONS AND CONCERNS:   1. In case of problems with machine or mask interface, please contact your DME company first. DME is the company that provides you the machine and/or CPAP supplies. If [employer] if your DME, you can reach them at [phone] or [employer] ([employer]) [phone].  2. For SLEEP STUDY appointments, please call [phone] ([place]) or [phone] / [phone] ([facility]) or any other  Sleep Lab location please call [phone]  3. For MEDICAL QUESTIONS, MEDICATION REFILLS, or CLINIC APPOINTMENT SCHEDULING, please call [phone] and my practice lead Fanny would gladly assist you with any concerns.   4. In the event that you are running more than 10 minutes late to your appointment or 5 minutes to virtual, I will kindly ask you to reschedule.    Here at [facility], we wish you a restful sleep!

## 2023-12-28 NOTE — PROGRESS NOTES
Patient: Arlet Vásquez  : 1965 AGE: 58 y.o. SEX:female   MRN: [id number]   Provider: [facility]     Location [facility]   Service Date: 2023     PCP: FITO Maloney   Referred by: No ref. provider found            [facility]  NEW PATIENT VISIT NOTE        HISTORY OF PRESENT ILLNESS     The patient's referring provider is: No ref. provider found  The patient's Primary Care Provider: FITO Maloney    HISTORY OF PRESENT ILLNESS   Arlet Vásquez is a 58 y.o. female with a pertinent hx of snoring, overweight, Asthma, seasonal allergies, and vitamin D deficiency, who presents to a [facility] for a sleep medicine evaluation with concerns of evaluation for sleep apnea    Patient reports that she occasionally snores, usually not bothersome to patient more her . Patient reports that snoring is worse with intake of alcohol. Patient reports that sleep overall is good. She sleeps mostly through the night, occasionally will get up to use restroom once. She wakes up feeling refreshed, denied any daytime sleepiness or fatigue.       SLEEP STUDY HISTORY  Ordered HSAT today    SLEEP-WAKE SCHEDULE  Bedtime:  9 - 11 pm on weekdays, 10 - 11 pm on weekends  Subjective sleep latency: 5 mins or less  Difficulty falling asleep: No    Number of awakenings: x1 times per night spontaneously  Falls back asleep in 10 mins or less  Difficulty staying asleep: No  due to nocturia  Final wake time:  545 - 7 am on weekdays, 7 - 8 am on weekends  Out of bed time: 545 - 7 am on weekdays, 7 - 8 am on weekends  [facility]   Naps: x1 per week for 20 mins  Feels rested after a nap: Yes   Average sleep duration (excluding naps): 8 - 9 hrs    SLEEP ENVIRONMENT  Sleep location: bed  Sleep status: sleeps with   Preferred sleep position: back and side  TV in bedroom:    Room is dark:  Yes  Room is quiet: Yes  Room is cool: Yes  Bed comfort: good    SLEEP HABITS   Activities before bedtime:   Activities in bed:   Clockwatching: No   Smoking: former  ETOH:  2- 3 glasses of wine per night  Marijuana: denied  Caffeine: daily  Sleep aids: denied    WEIGHT: stable    Claustrophobia: No     STOP-BAN  ESS: 9   ANTONIETA: 9  PHQ-2:  NEGATIVE SCREEN  KIKO-7: 0      REVIEW OF SYSTEMS     REVIEW OF SYSTEMS  Sleep-related ROS:  Night symptoms: POSITIVE for snoring  Morning symptoms: Patient denies morning symptoms and admits waking up refreshed in the morning.   Daytime symptoms Patient denies daytime sleepiness, fatigue, and drowsy driving. Patient also denies issues with memory, mood, and concentration.  Hypersomnia / narcolepsy symptoms: Patient denies symptoms of a hypersomnolence disorder such as sleep paralysis, sleep-related hallucinations, recurrent sleep attacks, automatic behaviors, and cataplexy.   Parasomnia symptoms: Patient denies symptoms of parasomnia.  Movements in sleep: Patient denies problematic movements in sleep,     RLS screen:  RLSSCREEN: - Sensations: Patient does not have unusual sensations in their extremities that cause an urge to move them   - Movement: Patient has not been told that their legs kick or jerk during sleep    Naps:   Yes. Naps ARE/ARENOT: are refreshing.    Review of Systems  SLEEP ROS: snoring    All other systems have been reviewed and are negative.      ALLERGIES AND MEDICATIONS     ALLERGIES  No Known Allergies    MEDICATIONS  Current Outpatient Medications   Medication Sig Dispense Refill    albuterol 2.5 mg /3 mL (0.083 %) nebulizer solution Inhale.      fluticasone (Flonase) 50 mcg/actuation nasal spray Administer 1 spray into each nostril if needed for rhinitis. Shake gently. Before first use, prime pump. After use, clean tip and replace cap.      lifitegrast (Xiidra) 5 % dropperette Administer 1 drop into affected eye(s) 2 times a day.       "montelukast (Singulair) 10 mg tablet Take by mouth.       No current facility-administered medications for this visit.         PAST HISTORY     PERTINENT PAST MEDICAL HISTORY: See HPI    PERTINENT PAST SURGICAL HISTORY for Sleep Medicine:  non-contributory    PERTINENT FAMILY HISTORY for Sleep Medicine:  Patient denies family history of any sleep disorder.  Patient denies family history of sleep apnea.    PERTINENT SOCIAL HISTORY:  She  reports that she has quit smoking. Her smoking use included cigarettes. She has never used smokeless tobacco. No history on file for alcohol use and drug use. She currently lives with family and employed full-time    Active Problems, Allergy List, Medication List, and PMH/PSH/FH/Social Hx have been reviewed and reconciled in chart. No significant changes unless documented in the pertinent chart section. Updates made when necessary.       PHYSICAL EXAM     VITAL SIGNS: /75   Pulse 72   Ht 1.6 m (5' 3\")   Wt 76.3 kg (168 lb 3.2 oz)   SpO2 94%   BMI 29.80 kg/m²     NECK CIRCUMFERENCE:     CURRENT WEIGHT:   Vitals:    12/28/23 0734   Weight: 76.3 kg (168 lb 3.2 oz)      BMI: Body mass index is 29.8 kg/m².      PREVIOUS WEIGHTS:  Wt Readings from Last 3 Encounters:   12/28/23 76.3 kg (168 lb 3.2 oz)   10/24/19 70.3 kg (154 lb 15.7 oz)       Physical Exam  Constitutional: Awake, not in distress  Lungs: Clear to auscultation bilateral, no cough noted  Heart: Regular rate and rhythm  Skin: Warm, no rash  Neuro: No tremors, moves all extremities  Psych: alert and oriented to time, place, and person    ENT: Modified Mallampati score - II           RESULTS/DATA     No results found for: \"IRON\", \"TRANSFERRIN\", \"IRONSAT\", \"TIBC\", \"FERRITIN\"    Bicarbonate   Date Value Ref Range Status   06/14/2023 25 21 - 32 mmol/L Final       PAP Adherence  Not applicable      ASSESSMENT/PLAN     Assessment/Plan   Arlet Vásquez is a 58 y.o. female presents today in [facility] with the following problems:      SLEEP DISORDERED BREATHING/SUSPECTED SLEEP APNEA  - Patient's risk factors for JASMIN: age, postmenopause, asthma, use of ETOH, and narrow crowded upper airway anatomy  - We discussed testing options today in clinic, HSAT vs In-lab  - HSAT is reasonable as patient likely has JASMIN based on history and exam and does not have any of the following comorbidities: Afib, CHF, seizures, neuromuscular weakness, hypoventilation, or significant COPD.   - Discussed JASMIN pathophysiology, diagnostic testing (HST vs PSG), cardiometabolic and neurocognitive sequelae of untreated JASMIN, and treatment options (PAP therapy, oral appliance, surgery, hypoglossal nerve stimulator called INSPIRE, etc).        OVERWEIGHT  - BMI today Body mass index is 29.8 kg/m².   - most recent Bicarb WNL, low suspicion for obesity hypoventilation syndrome  - Encouraged patient to lose weight with diet and exercise.   - Weight loss can help in the long term treatment of JASMIN.  - Defer management to PCP    ALLERGIC RHINITIS / SEASONAL ALLERGIES / ASTHMA  - Continue with fluticasone nasal spray as prescribed  - Continue with singular & albuterol inhaler as prescribed   - reports only uses inhaler ~5 per year   - symptoms well controlled with prescribed medications    All of patient's questions were answered. She verbalizes understanding and agreement with my assessment and plan.

## 2024-01-22 ENCOUNTER — PROCEDURE VISIT (OUTPATIENT)
Dept: SLEEP MEDICINE | Facility: CLINIC | Age: 59
End: 2024-01-22
Payer: COMMERCIAL

## 2024-01-22 DIAGNOSIS — G47.30 SLEEP-DISORDERED BREATHING: ICD-10-CM

## 2024-01-22 DIAGNOSIS — R06.83 SNORING: ICD-10-CM

## 2024-01-22 DIAGNOSIS — G47.33 OBSTRUCTIVE SLEEP APNEA (ADULT) (PEDIATRIC): ICD-10-CM

## 2024-01-22 PROCEDURE — 95806 SLEEP STUDY UNATT&RESP EFFT: CPT | Performed by: PSYCHIATRY & NEUROLOGY

## 2024-01-22 NOTE — PROGRESS NOTES
The patient received equipment and instructions for use of the [employer] [facility] Nomad HSAT device. The patient was instructed how to apply the effort belts, cannula, thermistor. It was also explained how the Nomad and oximeter components work.  The patient was asked to record their sleep for an 8-hour period.     The patient was informed of their responsibility for the device and acknowledged this by signing the HSAT device contract. The patient was asked to return the device on 01/23/2024 to [facility]'s front .     The patient was instructed to call 911 as usual for any medical- emergencies while at home.  The patient was also given a phone number for troubleshooting when using the device in case there were additional questions.

## 2024-01-23 PROCEDURE — 95806 SLEEP STUDY UNATT&RESP EFFT: CPT | Performed by: PSYCHIATRY & NEUROLOGY

## 2024-02-08 ENCOUNTER — OFFICE VISIT (OUTPATIENT)
Dept: SLEEP MEDICINE | Facility: CLINIC | Age: 59
End: 2024-02-08
Payer: COMMERCIAL

## 2024-02-08 VITALS
WEIGHT: 166.6 LBS | OXYGEN SATURATION: 96 % | SYSTOLIC BLOOD PRESSURE: 114 MMHG | HEART RATE: 76 BPM | BODY MASS INDEX: 29.51 KG/M2 | DIASTOLIC BLOOD PRESSURE: 76 MMHG

## 2024-02-08 DIAGNOSIS — G47.33 OSA (OBSTRUCTIVE SLEEP APNEA): Primary | ICD-10-CM

## 2024-02-08 DIAGNOSIS — E66.3 OVERWEIGHT (BMI 25.0-29.9): ICD-10-CM

## 2024-02-08 DIAGNOSIS — R06.83 SNORING: ICD-10-CM

## 2024-02-08 PROCEDURE — 1036F TOBACCO NON-USER: CPT

## 2024-02-08 PROCEDURE — 99213 OFFICE O/P EST LOW 20 MIN: CPT

## 2024-02-08 PROCEDURE — 3008F BODY MASS INDEX DOCD: CPT

## 2024-02-08 ASSESSMENT — SLEEP AND FATIGUE QUESTIONNAIRES
SLEEP_PROBLEM_NOTICEABLE_TO_OTHERS: MUCH
HOW LIKELY ARE YOU TO NOD OFF OR FALL ASLEEP WHEN YOU ARE A PASSENGER IN A CAR FOR AN HOUR WITHOUT A BREAK: SLIGHT CHANCE OF DOZING
HOW LIKELY ARE YOU TO NOD OFF OR FALL ASLEEP WHILE LYING DOWN TO REST IN THE AFTERNOON WHEN CIRCUMSTANCES PERMIT: MODERATE CHANCE OF DOZING
HOW LIKELY ARE YOU TO NOD OFF OR FALL ASLEEP WHILE SITTING AND TALKING TO SOMEONE: WOULD NEVER DOZE
ESS-CHAD TOTAL SCORE: 5
HOW LIKELY ARE YOU TO NOD OFF OR FALL ASLEEP IN A CAR, WHILE STOPPED FOR A FEW MINUTES IN TRAFFIC: WOULD NEVER DOZE
DIFFICULTY_STAYING_ASLEEP: MILD
HOW LIKELY ARE YOU TO NOD OFF OR FALL ASLEEP WHILE SITTING QUIETLY AFTER LUNCH WITHOUT ALCOHOL: WOULD NEVER DOZE
WORRIED_DISTRESSED_DUE_TO_SLEEP: A LITTLE
HOW LIKELY ARE YOU TO NOD OFF OR FALL ASLEEP WHILE SITTING AND READING: SLIGHT CHANCE OF DOZING
SATISFACTION_WITH_CURRENT_SLEEP_PATTERN: SATISFIED
SITING INACTIVE IN A PUBLIC PLACE LIKE A CLASS ROOM OR A MOVIE THEATER: WOULD NEVER DOZE
HOW LIKELY ARE YOU TO NOD OFF OR FALL ASLEEP WHILE WATCHING TV: SLIGHT CHANCE OF DOZING
SLEEP_PROBLEM_INTERFERES_DAILY_ACTIVITIES: NOT AT ALL NOTICEABLE

## 2024-02-08 ASSESSMENT — ANXIETY QUESTIONNAIRES
4. TROUBLE RELAXING: NOT AT ALL
3. WORRYING TOO MUCH ABOUT DIFFERENT THINGS: NOT AT ALL
7. FEELING AFRAID AS IF SOMETHING AWFUL MIGHT HAPPEN: NOT AT ALL
5. BEING SO RESTLESS THAT IT IS HARD TO SIT STILL: NOT AT ALL
GAD7 TOTAL SCORE: 0
1. FEELING NERVOUS, ANXIOUS, OR ON EDGE: NOT AT ALL
2. NOT BEING ABLE TO STOP OR CONTROL WORRYING: NOT AT ALL
6. BECOMING EASILY ANNOYED OR IRRITABLE: NOT AT ALL

## 2024-02-08 NOTE — PROGRESS NOTES
Patient: Arlet Vásquez  : 1965 AGE: 58 y.o. SEX:female   MRN: [id number]   Provider: MARIN [facility]     Location [facility]   Service Date: 2024     PCP: FITO Maloney   Referred by:               [facility]  FOLLOW-UP VISIT NOTE        HISTORY OF PRESENT ILLNESS     Patient ID: Arlet Vásquez is a 58 y.o. female who presents to a [facility] for follow-up review of sleep study results    Patient is here alone today.  The patient has pertinent hx of JASMIN, overweight, Asthma, seasonal allergies, and vitamin D deficiency,    Previous Visit's:  2023  Arlet Vásquez is a 58 y.o. female with a pertinent hx of snoring, overweight, Asthma, seasonal allergies, and vitamin D deficiency, who presents to a [facility] Clinic for a sleep medicine evaluation with concerns of evaluation for sleep apnea    Patient reports that she occasionally snores, usually not bothersome to patient more her . Patient reports that snoring is worse with intake of alcohol. Patient reports that sleep overall is good. She sleeps mostly through the night, occasionally will get up to use restroom once. She wakes up feeling refreshed, denied any daytime sleepiness or fatigue.       Interval History  Patient was last seen in 2023.     24   Patient here today to review sleep study results. I reviewed the results in depth with patient including the difference between 3% vs 4% scoring guidelines. Patient currently falls under 3% but does have [employer] insurance, which would be 4%. Her 3% score was 9.8/hr vs 4% score was 4.2/hr. I discussed treatment options with patient. Discussed with patient that there is no consistent evidence of health risks with mild JASMIN. The decision to treat mild JASMIN is based on symptoms, significant comorbid condition, or individual preference. Treatment options are PAP  therapy, oral mandibular device, and/or positional therapy coupled with weight loss, and avoidance of alcohol drinking at night.  No treatment may also be an option in select cases. Since patient has no significant cardiovascular comorbid conditions or symptoms, we decided not to treat.  Her main concern is snoring that is bothersome to her . She does have a known hx of deviated septum and chronic sinusitis. She is supposed to take Flonase but takes as needed. I encouraged the patient to use Flonase daily. Referral to ENT was sent for her snoring.   She asked about trying her husbands CPAP (he is non-complaint), I encouraged her to try and if she would like to start PAP therapy to let the office know.     Weight remains stable, encourage patient to maintain healthy weight. If weight gain occurs to return to clinic for re-evaluation.       SLEEP HISTORY     SLEEP STUDY HISTORY  HSAT - 1/23/2024  BMI - 28.2  TRT - 597 mins  TMT - 532 mins  AHI3% - 9.8/hr  AHI4% - 4.2/hr  MOOSE - 0/hr  Supine AHI - 11.1/hr  Non-supine AHI - 5.2/hr  SpO2 taj - 82%   <88% = 72 mins      SLEEP-WAKE SCHEDULE  Bedtime: 9 - 11 pm on weekdays, 10 - 11 pm on weekends  Subjective sleep latency: 5 mins or less  Difficulty falling asleep: No    Number of awakenings: x1 times per night spontaneously  Falls back asleep in 10 mins or less  Difficulty staying asleep: No  due to nocturia  Final wake time: 545 - 7 am on weekdays, 7 - 8 am on weekends  Out of bed time: 545 - 7 am on weekdays, 7 - 8 am on weekends  Shift work: dayshift - 8 - \[employer]\"" child support   Naps: x1 per week for 20 mins  Feels rested after a nap: Yes   Average sleep duration (excluding naps): 8 - 9 hrs    SLEEP ENVIRONMENT  Sleep location: bed  Sleep status: sleeps with   Preferred sleep position: back and side  TV in bedroom:   Room is dark:  Yes  Room is quiet: Yes  Room is cool: Yes  Bed comfort: good    SLEEP HABITS   Activities before bedtime:    Activities in bed:   Clockwatching: No   Smoking: former  ETOH:  2- 3 glasses of wine per night  Marijuana: denied  Caffeine: daily  Sleep aids: denied    WEIGHT: stable    Claustrophobia: No     REVIEW OF SYSTEMS     REVIEW OF SYSTEMS  SLEEP ROS   Review of Systems  SLEEP ROS: snoring      All other systems have been reviewed and are negative.      ALLERGIES     No Known Allergies    MEDICATIONS     Current Outpatient Medications   Medication Sig Dispense Refill    albuterol 2.5 mg /3 mL (0.083 %) nebulizer solution Inhale.      ergocalciferol, vitamin D2, (VITAMIN D2 ORAL) Take 1,000 Units by mouth once daily.      fluticasone (Flonase) 50 mcg/actuation nasal spray Administer 1 spray into each nostril if needed for rhinitis. Shake gently. Before first use, prime pump. After use, clean tip and replace cap.      lifitegrast (Xiidra) 5 % dropperette Administer 1 drop into affected eye(s) 2 times a day.      montelukast (Singulair) 10 mg tablet Take by mouth.      psyllium (Metamucil) powder Take 1 Dose (5.8 g) by mouth if needed.       No current facility-administered medications for this visit.       PAST HISTORY     PERTINENT PAST MEDICAL HISTORY: See HPI    PERTINENT PAST SURGICAL HISTORY for Sleep Medicine:  non-contributory    PERTINENT FAMILY HISTORY for Sleep Medicine:  Patient denies family history of any sleep disorder.  Patient denies family history of sleep apnea.    PERTINENT SOCIAL HISTORY:  She  reports that she has quit smoking. Her smoking use included cigarettes. She has never used smokeless tobacco. No history on file for alcohol use and drug use. She currently lives with family and employed full-time    Active Problems, Allergy List, Medication List, and PMH/PSH/FH/Social Hx have been reviewed and reconciled in chart. No significant changes unless documented in the pertinent chart section. Updates made when necessary.     PHYSICAL EXAM     VITAL SIGNS: /76   Pulse 76   Wt 75.6 kg (166 lb  "9.6 oz)   SpO2 96%   BMI 29.51 kg/m²     CURRENT WEIGHT:   Vitals:    24 0716   Weight: 75.6 kg (166 lb 9.6 oz)      BMI: Body mass index is 29.51 kg/m².     PREVIOUS WEIGHTS:  Wt Readings from Last 3 Encounters:   24 75.6 kg (166 lb 9.6 oz)   23 76.3 kg (168 lb 3.2 oz)   10/24/19 70.3 kg (154 lb 15.7 oz)       STOP-BAN    Today ESS: 5  Last visit ESS: 9    Today ANTONIETA: 6  Last visit ANTONIETA: 9    Today PHQ-2: NEGATIVE SCREEN  Last visit PHQ-2: NEGATIVE SCREEN    Today KIKO-7: 0  Last visit KIKO-7: 0    Physical Exam  Constitutional: Awake, not in distress  Lungs: no cough noted  Skin: Warm, no visible rashes  Neuro: No tremors, moves all extremities  Psych: Alert and oriented to time, place, and person      RESULTS/DATA     No results found for: \"IRON\", \"TRANSFERRIN\", \"IRONSAT\", \"TIBC\", \"FERRITIN\"    Bicarbonate   Date Value Ref Range Status   2023 25 21 - 32 mmol/L Final       PAP Adherence  Not applicable    ASSESSMENT/PLAN     Assessment/Plan   Arlet Vásquez is a 58 y.o. female presents today in [facility] with the following problems:      OBSTRUCTIVE SLEEP APNEA, mild (HSAT AHI: 9.8/hr)  - Patient's risk factors for JASMIN: age, postmenopause, asthma, use of ETOH, and narrow crowded upper airway anatomy  - JASMIN diagnosed by HSAT in 2024. Reviewed sleep studies today in clinic. See HPI.  - Discussed with patient that there is no consistent evidence of health risks with mild JASMIN. The decision to treat mild JASMIN is based on symptoms, significant comorbid condition, or individual preference. Treatment options are PAP therapy, oral mandibular device, and/or positional therapy coupled with weight loss, and avoidance of alcohol drinking at night.  No treatment may also be an option in select cases.  - Since patient has no significant cardiovascular comorbid conditions or symptoms and patient refused CPAP or oral appliance, we decided not to treat.   - will send for " ENT referral for snoring (deviated septum)      OVERWEIGHT  - BMI today Body mass index is 29.51 kg/m².   - Encouraged patient to lose weight with diet and exercise.    - any significant weight change to come back to clinic for evaluation  - Weight loss can help in the long term treatment of JASMIN.  - Defer management to PCP    DEPRESSION / ANXIETY screen  - NEGATIVE SCREEN today 02/08/24      SMOKING STATUS screen  - former smoker     All of patient's questions were answered. She verbalizes understanding and agreement with my assessment and plan.

## 2024-02-08 NOTE — PATIENT INSTRUCTIONS
It was a pleasure meeting you today Arlet Vásquez       As we discussed today in clinic:    Your sleep study was negative based on 4% scoring guidelines, 3% scoring showed mild JASMIN at 9/hr  You can try your  CPAP, if you want to proceed with one, let the office know and we will order you one  Follow-up with ENT for consult for snoring.  Remember to take your Flonase daily to help.       Please follow-up in 6 months    ALWAYS BRING YOUR CPAP / BIPAP WITH YOU TO EVERY APPOINTMENT!  THANKS    FOR QUESTIONS AND CONCERNS:   1. In case of problems with machine or mask interface, please contact your DME company first. DME is the company that provides you the machine and/or CPAP supplies. If [employer] if your DME, you can reach them at [phone] or [employer] ([employer]) [phone].  2. For SLEEP STUDY appointments, please call [phone] ([place]) or [phone] / [phone] ([facility]) or any other  Sleep Lab location please call [phone]  3. For MEDICAL QUESTIONS, MEDICATION REFILLS, or CLINIC APPOINTMENT SCHEDULING, please call [phone] and my practice lead Fanny would gladly assist you with any concerns.   4. In the event that you are running more than 10 minutes late to your appointment or 5 minutes to virtual, I will kindly ask you to reschedule.    Here at [facility], we wish you a restful sleep!

## 2024-03-14 DIAGNOSIS — T85.848A PAIN FROM IMPLANTED HARDWARE, INITIAL ENCOUNTER: Primary | ICD-10-CM

## 2024-04-06 ENCOUNTER — HOSPITAL ENCOUNTER (OUTPATIENT)
Dept: RADIOLOGY | Facility: HOSPITAL | Age: 59
Discharge: HOME | End: 2024-04-06
Payer: COMMERCIAL

## 2024-04-06 DIAGNOSIS — T85.848A PAIN FROM IMPLANTED HARDWARE, INITIAL ENCOUNTER: ICD-10-CM

## 2024-04-06 PROCEDURE — 73630 X-RAY EXAM OF FOOT: CPT | Mod: LT

## 2024-04-06 PROCEDURE — 73630 X-RAY EXAM OF FOOT: CPT | Mod: LEFT SIDE | Performed by: STUDENT IN AN ORGANIZED HEALTH CARE EDUCATION/TRAINING PROGRAM

## 2024-06-29 ENCOUNTER — HOSPITAL ENCOUNTER (OUTPATIENT)
Dept: RADIOLOGY | Facility: HOSPITAL | Age: 59
Discharge: HOME | End: 2024-06-29
Payer: COMMERCIAL

## 2024-06-29 VITALS — BODY MASS INDEX: 29.51 KG/M2 | HEIGHT: 63 IN

## 2024-06-29 DIAGNOSIS — Z12.31 ENCOUNTER FOR SCREENING MAMMOGRAM FOR MALIGNANT NEOPLASM OF BREAST: ICD-10-CM

## 2024-06-29 PROCEDURE — 77063 BREAST TOMOSYNTHESIS BI: CPT | Performed by: RADIOLOGY

## 2024-06-29 PROCEDURE — 77067 SCR MAMMO BI INCL CAD: CPT | Performed by: RADIOLOGY

## 2024-06-29 PROCEDURE — 77067 SCR MAMMO BI INCL CAD: CPT

## 2024-08-07 ENCOUNTER — OFFICE VISIT (OUTPATIENT)
Dept: SLEEP MEDICINE | Facility: CLINIC | Age: 59
End: 2024-08-07
Payer: COMMERCIAL

## 2024-08-07 VITALS
WEIGHT: 168.4 LBS | OXYGEN SATURATION: 94 % | BODY MASS INDEX: 29.83 KG/M2 | DIASTOLIC BLOOD PRESSURE: 83 MMHG | SYSTOLIC BLOOD PRESSURE: 124 MMHG | HEART RATE: 81 BPM

## 2024-08-07 DIAGNOSIS — E66.3 OVERWEIGHT (BMI 25.0-29.9): ICD-10-CM

## 2024-08-07 DIAGNOSIS — Z13.31 NEGATIVE DEPRESSION SCREENING: ICD-10-CM

## 2024-08-07 DIAGNOSIS — Z87.891 FORMER SMOKER: ICD-10-CM

## 2024-08-07 DIAGNOSIS — R06.83 SNORING: ICD-10-CM

## 2024-08-07 DIAGNOSIS — G47.9 SLEEP DISTURBANCE: ICD-10-CM

## 2024-08-07 DIAGNOSIS — G47.33 OSA (OBSTRUCTIVE SLEEP APNEA): Primary | ICD-10-CM

## 2024-08-07 DIAGNOSIS — R53.83 FATIGUE, UNSPECIFIED TYPE: ICD-10-CM

## 2024-08-07 PROBLEM — R21 BUTTERFLY RASH: Status: ACTIVE | Noted: 2024-08-07

## 2024-08-07 PROBLEM — M79.671 PAIN IN RIGHT FOOT: Status: ACTIVE | Noted: 2024-08-07

## 2024-08-07 PROBLEM — B35.3 TINEA PEDIS: Status: RESOLVED | Noted: 2024-08-07 | Resolved: 2024-08-07

## 2024-08-07 PROCEDURE — 99214 OFFICE O/P EST MOD 30 MIN: CPT

## 2024-08-07 PROCEDURE — 1036F TOBACCO NON-USER: CPT

## 2024-08-07 PROCEDURE — G2211 COMPLEX E/M VISIT ADD ON: HCPCS

## 2024-08-07 ASSESSMENT — ENCOUNTER SYMPTOMS
DIFFICULTY WITH CONCENTRATION: 0
DEPRESSED MOOD: 0
FATIGUES EASILY: 0
FATIGUE: 0
SNORING: 1
EXCESSIVE DAYTIME SLEEPINESS: 0
INSOMNIA: 0
HYPERSOMNIA: 0

## 2024-08-07 ASSESSMENT — SLEEP AND FATIGUE QUESTIONNAIRES
HOW LIKELY ARE YOU TO NOD OFF OR FALL ASLEEP WHILE WATCHING TV: SLIGHT CHANCE OF DOZING
SITING INACTIVE IN A PUBLIC PLACE LIKE A CLASS ROOM OR A MOVIE THEATER: WOULD NEVER DOZE
ESS-CHAD TOTAL SCORE: 5
SLEEP_PROBLEM_NOTICEABLE_TO_OTHERS: VERY MUCH NOTICEABLE
WORRIED_DISTRESSED_DUE_TO_SLEEP: MUCH
HOW LIKELY ARE YOU TO NOD OFF OR FALL ASLEEP WHILE SITTING QUIETLY AFTER LUNCH WITHOUT ALCOHOL: WOULD NEVER DOZE
HOW LIKELY ARE YOU TO NOD OFF OR FALL ASLEEP WHEN YOU ARE A PASSENGER IN A CAR FOR AN HOUR WITHOUT A BREAK: SLIGHT CHANCE OF DOZING
SLEEP_PROBLEM_INTERFERES_DAILY_ACTIVITIES: SOMEWHAT
DIFFICULTY_STAYING_ASLEEP: MILD
HOW LIKELY ARE YOU TO NOD OFF OR FALL ASLEEP WHILE SITTING AND READING: WOULD NEVER DOZE
HOW LIKELY ARE YOU TO NOD OFF OR FALL ASLEEP WHILE LYING DOWN TO REST IN THE AFTERNOON WHEN CIRCUMSTANCES PERMIT: HIGH CHANCE OF DOZING
WAKING_TOO_EARLY: MILD
HOW LIKELY ARE YOU TO NOD OFF OR FALL ASLEEP WHILE SITTING AND TALKING TO SOMEONE: WOULD NEVER DOZE
HOW LIKELY ARE YOU TO NOD OFF OR FALL ASLEEP IN A CAR, WHILE STOPPED FOR A FEW MINUTES IN TRAFFIC: WOULD NEVER DOZE
SATISFACTION_WITH_CURRENT_SLEEP_PATTERN: SATISFIED

## 2024-08-07 ASSESSMENT — ANXIETY QUESTIONNAIRES
4. TROUBLE RELAXING: NOT AT ALL
GAD7 TOTAL SCORE: 0
1. FEELING NERVOUS, ANXIOUS, OR ON EDGE: NOT AT ALL
6. BECOMING EASILY ANNOYED OR IRRITABLE: NOT AT ALL
5. BEING SO RESTLESS THAT IT IS HARD TO SIT STILL: NOT AT ALL
7. FEELING AFRAID AS IF SOMETHING AWFUL MIGHT HAPPEN: NOT AT ALL
3. WORRYING TOO MUCH ABOUT DIFFERENT THINGS: NOT AT ALL
2. NOT BEING ABLE TO STOP OR CONTROL WORRYING: NOT AT ALL

## 2024-08-07 NOTE — PATIENT INSTRUCTIONS
It was a pleasure meeting you today Arlet Vásquez     As we discussed today in clinic:    1. Do in-lab sleep study.    2. Encouraged to lose weight.   3. Remember, don't drive when sleepy.   4. Follow-up with Dr. Rob Larios, my last day with [facility] will be August 16th, 2024. I am here to assist you for any issues in the interim.      Please follow-up in 4 - 6 weeks after testing    ALWAYS BRING YOUR CPAP / BIPAP WITH YOU TO EVERY APPOINTMENT!  THANKS    EDUCATION WEBSITES for further information:   1. American Academy of Sleep Medicine http://sleepeducation.org  2. National Sleep Foundation: https://sleepfoundation.org  3. American Sleep Apnea Association: https://www.sleepapnea.org (for patients with sleep apnea)  4. Go to www.inspiresleep.com learn about Inspire Implant.    FOR QUESTIONS AND CONCERNS:   1. In case of problems with machine or mask interface, please contact your DME company first. DME is the company that provides you the machine and/or CPAP supplies. If [employer] if your DME, you can reach them at [phone] or [employer] ([employer]) [phone].  2. For SLEEP STUDY appointments, please call [phone] ([place]) or [phone] / [phone] ([place]) or any other  Sleep Lab location please call [phone]  3. For MEDICAL QUESTIONS, MEDICATION REFILLS, or CLINIC APPOINTMENT SCHEDULING, please call [phone] and my practice lead Fanny would gladly assist you with any concerns.     Here at [facility], we wish you a restful sleep!

## 2024-08-07 NOTE — PROGRESS NOTES
Patient: Arlet Vásquez  : 1965 AGE: 59 y.o. SEX:female   MRN: [id number]   Provider: MARIN [facility]     Location [facility]   Service Date: 2024     PCP: FITO Maloney   Referred by:               [facility]  FOLLOW-UP VISIT NOTE        HISTORY OF PRESENT ILLNESS     Patient ID: Arlet Vásquez is a 59 y.o. female who presents to a [facility] for follow-up evaluation for sleep apnea.    Patient is here alone today.  The patient has pertinent hx of JASMIN, overweight, Asthma, seasonal allergies, and vitamin D deficiency,    Previous Visit's:  24   Patient here today to review sleep study results. I reviewed the results in depth with patient including the difference between 3% vs 4% scoring guidelines. Patient currently falls under 3% but does have [employer] insurance, which would be 4%. Her 3% score was 9.8/hr vs 4% score was 4.2/hr. I discussed treatment options with patient. Discussed with patient that there is no consistent evidence of health risks with mild JASMIN. The decision to treat mild JASMIN is based on symptoms, significant comorbid condition, or individual preference. Treatment options are PAP therapy, oral mandibular device, and/or positional therapy coupled with weight loss, and avoidance of alcohol drinking at night.  No treatment may also be an option in select cases. Since patient has no significant cardiovascular comorbid conditions or symptoms, we decided not to treat.  Her main concern is snoring that is bothersome to her . She does have a known hx of deviated septum and chronic sinusitis. She is supposed to take Flonase but takes as needed. I encouraged the patient to use Flonase daily. Referral to ENT was sent for her snoring.   She asked about trying her husbands CPAP (he is non-complaint), I encouraged her to try and if she would like to start PAP therapy to let the office  TRANSFER - IN REPORT:    Verbal report received from [id number] Davdi Fall RN (name) on Chasidy Velazquez  being received from PACU (unit) for routine post - op      Report consisted of patients Situation, Background, Assessment and   Recommendations(SBAR). Information from the following report(s) SBAR was reviewed with the receiving nurse. Opportunity for questions and clarification was provided. Assessment completed upon patients arrival to unit and care assumed. know.     Weight remains stable, encourage patient to maintain healthy weight. If weight gain occurs to return to clinic for re-evaluation.     12/28/2023  Arlet Vásquez is a 58 y.o. female with a pertinent hx of snoring, overweight, Asthma, seasonal allergies, and vitamin D deficiency, who presents to a [facility] for a sleep medicine evaluation with concerns of evaluation for sleep apnea    Patient reports that she occasionally snores, usually not bothersome to patient more her . Patient reports that snoring is worse with intake of alcohol. Patient reports that sleep overall is good. She sleeps mostly through the night, occasionally will get up to use restroom once. She wakes up feeling refreshed, denied any daytime sleepiness or fatigue.     Interval History  Patient was last seen in 2/2024.     08/07/24   Patient here today for re-evaluation of sleep apnea. She reports that since previous visit, she has worsened snoring and gurgling at night per her . She previously had HSAT in January 2024 that showed mild JASMIN. I will send her back to the sleep lab for SN-PSG as she was borderline mild JASMIN. She is agreeable to this plan.   Otherwise, does not report any other complaints.   Her weight is relatively stable since previous testing, only up a few pounds.   BP is WNL. Negative MH screens    We discussed today that I will be leaving [facility] as of August 16th, 2024. I discussed follow-up plan with patient today. She will be transitioned to Dr. Rob Larios in [place]. His clinic and contact information was given to the patient in clinic. She verbalized understanding.  SLEEP HISTORY     SLEEP STUDY HISTORY  HSAT - 1/23/2024  BMI - 28.2  TRT - 597 mins  TMT - 532 mins  AHI3% - 9.8/hr  AHI4% - 4.2/hr  MOOSE - 0/hr  Supine AHI - 11.1/hr  Non-supine AHI - 5.2/hr  SpO2 taj - 82%   <88% = 72 mins    SLEEP-WAKE SCHEDULE  Bedtime: 9 - 10 pm  Wake time: 545 - 645 am    SLEEP  HABITS   Smoking: former  ETOH:  YES  Marijuana: DENIED  Caffeine:  YES  Sleep aids: denied     WEIGHT: gained 2 - 3 lbs     REVIEW OF SYSTEMS     REVIEW OF SYSTEMS  SLEEP ROS   Review of Systems   Constitutional:  Negative for fatigue.   Respiratory:  Positive for snoring.    Neurological:  Negative for difficulty with concentration and excessive daytime sleepiness.   Psychiatric/Behavioral:  The patient does not have insomnia.      Psych Review of Symptoms:    Anxiety:   Generalized Anxiety Symptoms: No difficulty controlling worry, no excessive worry, no difficulty with concentration, does not fatigues easily, no physiological symptoms of anxiety and no sleep disturbances due to anxiety.      Depressive Symptoms: Patient denied any symptoms.   No depressed mood, no decreased interest, no fatigue, no hypersomnia, not irritable, no insomnia and no suicidal ideation.      Sleep Concerns:   Difficulty staying asleep, restless sleep, awakening from sleep and snoring. No excessive daytime sleepiness and no difficulty falling asleep.         All other systems have been reviewed and are negative.      ALLERGIES     No Known Allergies    MEDICATIONS     Current Outpatient Medications   Medication Sig Dispense Refill    albuterol 2.5 mg /3 mL (0.083 %) nebulizer solution Inhale.      ergocalciferol, vitamin D2, (VITAMIN D2 ORAL) Take 1,000 Units by mouth once daily.      fluticasone (Flonase) 50 mcg/actuation nasal spray Administer 1 spray into each nostril if needed for rhinitis. Shake gently. Before first use, prime pump. After use, clean tip and replace cap.      lifitegrast (Xiidra) 5 % dropperette Administer 1 drop into affected eye(s) 2 times a day.      montelukast (Singulair) 10 mg tablet Take by mouth.      psyllium (Metamucil) powder Take 1 Dose (5.8 g) by mouth if needed.       No current facility-administered medications for this visit.       PAST HISTORY     PERTINENT PAST MEDICAL HISTORY: See HPI    PERTINENT  "PAST SURGICAL HISTORY for Sleep Medicine:  non-contributory    PERTINENT FAMILY HISTORY for Sleep Medicine:  Patient denies family history of any sleep disorder.  Patient denies family history of sleep apnea.    PERTINENT SOCIAL HISTORY:  She  reports that she has quit smoking. Her smoking use included cigarettes. She has never used smokeless tobacco. No history on file for alcohol use and drug use. She currently lives with family and employed full-time    Active Problems, Allergy List, Medication List, and PMH/PSH/FH/Social Hx have been reviewed and reconciled in chart. No significant changes unless documented in the pertinent chart section. Updates made when necessary.     PHYSICAL EXAM     VITAL SIGNS: /83   Pulse 81   Wt 76.4 kg (168 lb 6.4 oz)   SpO2 94%   BMI 29.83 kg/m²     CURRENT WEIGHT:   Vitals:    08/07/24 1317   Weight: 76.4 kg (168 lb 6.4 oz)      BMI: Body mass index is 29.83 kg/m².     PREVIOUS WEIGHTS:  Wt Readings from Last 3 Encounters:   08/07/24 76.4 kg (168 lb 6.4 oz)   02/08/24 75.6 kg (166 lb 9.6 oz)   12/28/23 76.3 kg (168 lb 3.2 oz)       Today ESS: 5  Today ANTONIETA: 12  Today KIKO-7: 0   Today PHQ-2: NEGATIVE SCREEN    PHYSICAL EXAMINATION  General appearance: awake alert in NAD  Affect: normal  Skin: no rash noted to face  HEENT: Nasal congestion absent  Teeth: normal dentition  Lungs: no cough.  Extr: moves all four extremities  Neuro: normal speech        RESULTS/DATA     No results found for: \"IRON\", \"TRANSFERRIN\", \"IRONSAT\", \"TIBC\", \"FERRITIN\"    Bicarbonate   Date Value Ref Range Status   06/14/2023 25 21 - 32 mmol/L Final       PAP Adherence  Not applicable    ASSESSMENT/PLAN     Assessment/Plan   Arlet Vásquez is a 59 y.o. female presents today in [facility] with the following problems:    OBSTRUCTIVE SLEEP APNEA, mild (HSAT AHI: 9.8/hr)  - Patient's risk factors for JASMIN: age, postmenopause, asthma, use of ETOH, and narrow crowded upper " airway anatomy  - Current symptoms are: see HPI  - JASMIN diagnosed by HSAT in 1/024. Reviewed sleep studies previously in clinic. See HPI.  - Discussed JASMIN pathophysiology, diagnostic testing (HST vs PSG), cardiometabolic and neurocognitive sequelae of untreated JASMIN, and treatment options (PAP therapy, oral appliance, surgery, hypoglossal nerve stimulator called INSPIRE, etc).    - patient previously had inconclusive HSAT mild vs no JASMIN depending on 3% vs 4% guidelines. She reports worsening symptoms as well as slight weight gain. At this time, we will send to sleep lab for further evaluation. She is agreeable to SN-PSG.       FATIGUE  + SLEEP DISTURBANCES  - due to combination of inadequate sleep hygiene, untreated sleep apnea, and alcohol use. Per review of medication list, it does NOT appear medications are a contributing factor.  - discussed with patient good sleep hygiene   - Important to get good daily dose of natural sunlight to help wakefulness & energy  - Reduce artificial lighting at night, especially light from screens (i.e. cellphones, TV, tablets)  - Exercise is helpful for your mental and physical health  - Have a consistent bedtime routine 1 hours prior to your usual bedtime  - If going to take a nap, it should be less than 30 minutes and prior to 3 pm  - Limit alcohol and caffeine use   - Avoidance of marijuana and/or CBD use    OVERWEIGHT  - BMI today Body mass index is 29.83 kg/m².   - slight weight gain since previous testing  - Encouraged patient to lose weight with diet and exercise.   - Weight loss can help in the long term treatment of JASMIN.  - defer management to PCP       BLOOD PRESSURE CHECK  - BP today 124/83  - vitals WNL    DEPRESSION / ANXIETY screen  - NEGATIVE SCREEN today 08/07/24      SMOKING STATUS screen  - former smoker     All of patient's questions were answered. She verbalizes understanding and agreement with my assessment and plan.

## 2024-08-08 ENCOUNTER — APPOINTMENT (OUTPATIENT)
Dept: SLEEP MEDICINE | Facility: CLINIC | Age: 59
End: 2024-08-08
Payer: COMMERCIAL

## 2024-10-03 ENCOUNTER — CLINICAL SUPPORT (OUTPATIENT)
Dept: SLEEP MEDICINE | Facility: CLINIC | Age: 59
End: 2024-10-03
Payer: COMMERCIAL

## 2024-10-03 DIAGNOSIS — R53.83 FATIGUE, UNSPECIFIED TYPE: ICD-10-CM

## 2024-10-03 DIAGNOSIS — G47.9 SLEEP DISTURBANCE: ICD-10-CM

## 2024-10-03 DIAGNOSIS — G47.33 OSA (OBSTRUCTIVE SLEEP APNEA): ICD-10-CM

## 2024-10-03 DIAGNOSIS — R06.83 SNORING: ICD-10-CM

## 2024-10-04 NOTE — PROGRESS NOTES
[facility] TECH NOTE:     Patient: Arlet Vásquez   MRN//AGE: [id number]  1965  59 y.o.   Technologist: CINTIA Harris   Room: 108   Service Date: 10/3/2024        Sleep Testing Location: [facility]:     TECHNOLOGIST SLEEP STUDY PROCEDURE NOTE:   This sleep study is being conducted according to the policies and procedures outlined by the AAS accreditation standards.  The sleep study procedure and processes involved during this appointment was explained to the patient/patient’s family, questions were answered. The patient/family verbalized understanding.      The patient is a 59 y.o. year old female scheduled for a Diagnostic PSG Split night with montage of:  diagnostic PSG . she arrived for her appointment.      The study that was ultimately completed was a diagnostic PSG .    The full study Was completed.  Patient questionnaires completed?: yes     Consents signed? yes    Initial Fall Risk Screening:     Arlet has not fallen in the last 6 months. Arlet does not have a fear of falling. She does not need assistance with sitting, standing, or walking. she does not need assistance walking in her home. she does not need assistance in an unfamiliar setting. The patient is not using an assistive device.     Brief Study observations: Unremarkable PSG. Used bathroom once. Patient not split as the AHI did not meet lab criteria for PAP titration.     Deviation to order/protocol and reason: no      If PAP, which was preferred mask/pressure/mode: n/a      Other:None    After the procedure, the patient/family was informed to ensure followup with ordering clinician for testing results.      Technologist: CINTIA Harris

## 2024-10-05 ENCOUNTER — HOSPITAL ENCOUNTER (OUTPATIENT)
Dept: RADIOLOGY | Facility: HOSPITAL | Age: 59
Discharge: HOME | End: 2024-10-05
Payer: COMMERCIAL

## 2024-10-05 DIAGNOSIS — Z78.9 OTHER SPECIFIED HEALTH STATUS: ICD-10-CM

## 2024-10-05 PROCEDURE — 76705 ECHO EXAM OF ABDOMEN: CPT | Performed by: STUDENT IN AN ORGANIZED HEALTH CARE EDUCATION/TRAINING PROGRAM

## 2024-10-05 PROCEDURE — 76705 ECHO EXAM OF ABDOMEN: CPT

## 2024-11-27 ENCOUNTER — APPOINTMENT (OUTPATIENT)
Dept: SLEEP MEDICINE | Facility: CLINIC | Age: 59
End: 2024-11-27
Payer: COMMERCIAL

## 2024-11-27 VITALS
HEART RATE: 80 BPM | BODY MASS INDEX: 27.92 KG/M2 | WEIGHT: 157.6 LBS | SYSTOLIC BLOOD PRESSURE: 117 MMHG | OXYGEN SATURATION: 95 % | DIASTOLIC BLOOD PRESSURE: 86 MMHG

## 2024-11-27 DIAGNOSIS — G47.33 OBSTRUCTIVE SLEEP APNEA HYPOPNEA, MILD: Primary | ICD-10-CM

## 2024-11-27 PROCEDURE — 1036F TOBACCO NON-USER: CPT | Performed by: PSYCHIATRY & NEUROLOGY

## 2024-11-27 PROCEDURE — 99214 OFFICE O/P EST MOD 30 MIN: CPT | Performed by: PSYCHIATRY & NEUROLOGY

## 2024-11-27 RX ORDER — METRONIDAZOLE 7.5 MG/G
CREAM TOPICAL
COMMUNITY
Start: 2024-09-13

## 2024-11-27 ASSESSMENT — PATIENT HEALTH QUESTIONNAIRE - PHQ9
SUM OF ALL RESPONSES TO PHQ9 QUESTIONS 1 AND 2: 0
2. FEELING DOWN, DEPRESSED OR HOPELESS: NOT AT ALL
1. LITTLE INTEREST OR PLEASURE IN DOING THINGS: NOT AT ALL

## 2024-11-27 NOTE — PROGRESS NOTES
Patient: Arlet Vásquez    [id number]  : 1965 -- AGE 59 y.o.    Provider: Rob Larios MD     [facility]   Service Date: 2024              [facility]  Follow-up Note        HPI: Arlet Vásquez is a 59 y.o. female with mild JASMIN.  PMH notable for asthma, nasal septal deviation, chronic sinusitis, overweight, and vitamin D deficiency.  Her main pre-CPAP compliant was her loud snoring and gurgling during sleep. She is here today for a follow up visit.  She is new to me, but was last seen by my colleague, Rosa Gutierrez, MARIN-CNP, on 2024, at which time a repeat sleep study was ordered.    In-lab sleep study was done, see below.     Prior Sleep studies:   -Home sleep study 2024: BMI 28.2.  AHI3% 9.8/h, AHI4% 4.2/h.  Supine AHI3% 11.1/h, non-supine AHI3% 5.2/h.  SpO2 taj 82% with 72 minutes spent at or below 88%, most of which is due to artifactually low readings at the start of the recording. Respiratory events generally occurred in a cyclic pattern.  -PSG 10/3/24: weight 158 lbs, BMI 27.5. RDI3% 9.5/h, RDI4% 0.6/h, no REM predominance, mean SpO2 95% during sleep, taj 91%. Sleep efficiency reduced at 73%. No PLMS.      -Raw PSG data, reports, and hypnograms reviewed personally by me today.    Reviewed test results in detail with the patient.    Patient endorses that she continues to have loud snoring. No excessive sleepiness or fatigue during the daytime, but is occasionally tired in the daytime. Endorses having memory complaints.    Treatment options for JASMIN were reviewed.    She has not tried her 's CPAP (he does not use his).     She is interested in dental device vs nasal EPAP vs CPAP. States she sometimes mouth-breathes during sleep.    Using Flonase occasionally when needed.    Patient Active Problem List   Diagnosis    Hyperplastic colon polyp    Pruritus ani    Rectocele    Residual hemorrhoidal skin tags    Impingement  syndrome of left shoulder    Sleep-disordered breathing    Snoring    Overweight (BMI 25.0-29.9)    BMI 29.0-29.9,adult    Seasonal allergies    Asthma    JASMIN (obstructive sleep apnea)    Butterfly rash    Pain in right foot    Fatigue    Sleep disturbance    Former smoker     Past Medical History:   Diagnosis Date    Tinea pedis 08/07/2024    Tinea pedis 08/07/2024     Past Surgical History:   Procedure Laterality Date    APPENDECTOMY  06/28/2013    Appendectomy     Current Outpatient Medications on File Prior to Visit   Medication Sig Dispense Refill    ACETAMINOPHEN ORAL Take by mouth. PRN      albuterol 2.5 mg /3 mL (0.083 %) nebulizer solution Inhale.      ergocalciferol, vitamin D2, (VITAMIN D2 ORAL) Take 1,000 Units by mouth once daily.      fluticasone (Flonase) 50 mcg/actuation nasal spray Administer 1 spray into each nostril if needed for rhinitis. Shake gently. Before first use, prime pump. After use, clean tip and replace cap.      lifitegrast (Xiidra) 5 % dropperette Administer 1 drop into affected eye(s) 2 times a day.      loratadine-pseudoephedrine (Claritin-D 12-hour) 5-120 mg 12 hr tablet TAKE ONE TABLET BY MOUTH TWICE DAILY AS NEEDED FOR ALLERGY SYMPTOMS.      metroNIDAZOLE (Metrocream) 0.75 % cream Apply topically once daily.      psyllium (Metamucil) powder Take 1 Dose (5.8 g) by mouth if needed.      montelukast (Singulair) 10 mg tablet Take by mouth. (Patient not taking: Reported on 11/27/2024)       No current facility-administered medications on file prior to visit.       PHYSICAL EXAMINATION:   Vitals:    11/27/24 0828   BP: 117/86   BP Location: Left arm   Patient Position: Sitting   BP Cuff Size: Adult   Pulse: 80   SpO2: 95%   Weight: 71.5 kg (157 lb 9.6 oz)     Body mass index is 27.92 kg/m².  General: Awake. Alert. Comfortable. No apparent distress.   Speech: Normal.  Comprehension: Normal.  Mood: Stable.  Affect: Appropriate.  Pul:         Normal respiratory effort.   Abd:         "increased central adiposity  Neuro: Alert, well-oriented. Cranial nerves II-XII grossly normal and symmetric.  Moves all limbs symmetrically with no evidence of significant focal weakness. No abnormal movements noted. Normal gait      ASSESSMENT AND PLAN: Ms. Arlet Vásquez is a 59 y.o. female with mild JASMIN with indication to treat being her memory complaints. She would like to take some time to decide what she'd like to choose for treatment and will back later today.       #JASMIN  - mild  -treatment options reviewed  -CPAP setup packet provided in case she wants CPAP  -dental device pamphlets provided in case she wants a dental device  -showed pt the nasal EPAP device \"ULTepap\" to consider  -pt to call back later today with her choice of treatment      All of the above was discussed with the patient in detail. She voiced an understanding of the above and was agreeable to proceed further as advised.     34 minutes were spent with the patient plus time spent reviewing the chart, updating the chart as needed, and documenting.     FOLLOW UP: depends on pt's choice of treatment  "

## 2025-02-27 DIAGNOSIS — G47.33 OBSTRUCTIVE SLEEP APNEA HYPOPNEA, MILD: Primary | ICD-10-CM

## 2025-08-05 ENCOUNTER — HOSPITAL ENCOUNTER (OUTPATIENT)
Dept: RADIOLOGY | Facility: HOSPITAL | Age: 60
Discharge: HOME | End: 2025-08-05
Payer: COMMERCIAL

## 2025-08-05 VITALS — BODY MASS INDEX: 27.82 KG/M2 | WEIGHT: 157 LBS | HEIGHT: 63 IN

## 2025-08-05 DIAGNOSIS — Z12.31 ENCOUNTER FOR SCREENING MAMMOGRAM FOR MALIGNANT NEOPLASM OF BREAST: ICD-10-CM

## 2025-08-05 PROCEDURE — 77063 BREAST TOMOSYNTHESIS BI: CPT | Performed by: RADIOLOGY

## 2025-08-05 PROCEDURE — 77067 SCR MAMMO BI INCL CAD: CPT

## 2025-08-05 PROCEDURE — 77067 SCR MAMMO BI INCL CAD: CPT | Performed by: RADIOLOGY
